# Patient Record
Sex: MALE
[De-identification: names, ages, dates, MRNs, and addresses within clinical notes are randomized per-mention and may not be internally consistent; named-entity substitution may affect disease eponyms.]

---

## 2019-02-21 NOTE — EMERGENCY DEPARTMENT REPORT
ED General Adult HPI





- General


Chief complaint: Altered Mental Status


Stated complaint: AMS


Time Seen by Provider: 02/21/19 11:54


Source: family, EMS


Mode of arrival: Stretcher


Limitations: Altered Mental Status





- History of Present Illness


Initial comments: 





Patient to emergency department via EMS for altered mental status.  The 

patient's sister states the last 2 days the patient has not been his normal self

and has had a fever.  She states the patient is a daily drinker and last had 

something to drink a week ago.  The patient is not able to add to the history.


-: Gradual


Severity scale (0 -10): 0


Consistency: constant


Improves with: none


Worsens with: none


Associated Symptoms: denies other symptoms


Treatments Prior to Arrival: none





- Related Data


                                    Allergies











Allergy/AdvReac Type Severity Reaction Status Date / Time


 


No Known Allergies Allergy   Unverified 02/21/19 11:27














ED Review of Systems


ROS: 


Stated complaint: AMS


Other details as noted in HPI





Comment: Unobtainable due to pts medical conditions





ED Past Medical Hx





- Past Medical History


Hx COPD: Yes





- Social History


Smoking Status: Unknown if ever smoked





ED Physical Exam





- General


Limitations: Altered Mental Status


General appearance: obtunded





- Head


Head exam: Present: atraumatic, normocephalic





- Eye


Eye exam: Present: normal appearance, PERRL, EOMI





- ENT


ENT exam: Present: mucous membranes dry





- Neck


Neck exam: Present: normal inspection





- Respiratory


Respiratory exam: Present: normal lung sounds bilaterally.  Absent: respiratory 

distress, wheezes, rales





- Cardiovascular


Cardiovascular Exam: Present: normal rhythm, tachycardia.  Absent: systolic 

murmur, diastolic murmur, rubs, gallop





- GI/Abdominal


GI/Abdominal exam: Present: soft, normal bowel sounds.  Absent: distended, 

tenderness





- Rectal


Rectal exam: Present: heme (+) stool





- Extremities Exam


Extremities exam: Present: normal inspection





- Back Exam


Back exam: Present: normal inspection





- Neurological Exam


Neurological exam: Present: other (not able to completely assess due to the 

patient's condition)





- Psychiatric


Psychiatric exam: Present: other (not able to assess due to the patient's 

condition)





- Skin


Skin exam: Present: warm, dry, intact, normal color.  Absent: rash





ED Course


                                   Vital Signs











  02/21/19





  12:00


 


Temperature 96.8 F L


 


Pulse Rate 110 H


 


Respiratory 18





Rate 


 


Blood Pressure 129/87





[Right] 


 


O2 Sat by Pulse 97





Oximetry 














ED Medical Decision Making





- Lab Data


Result diagrams: 


                                 02/21/19 12:02





                                 02/21/19 12:02








                                   Lab Results











  02/21/19 02/21/19 02/21/19 Range/Units





  12:02 12:02 12:02 


 


WBC  12.2 H    (4.5-11.0)  K/mm3


 


RBC  4.18    (3.65-5.03)  M/mm3


 


Hgb  13.1    (11.8-15.2)  gm/dl


 


Hct  38.9    (35.5-45.6)  %


 


MCV  93    (84-94)  fl


 


MCH  31    (28-32)  pg


 


MCHC  34    (32-34)  %


 


RDW  13.5    (13.2-15.2)  %


 


Plt Count  370    (140-440)  K/mm3


 


Lymph % (Auto)  7.5 L    (13.4-35.0)  %


 


Mono % (Auto)  10.3 H    (0.0-7.3)  %


 


Eos % (Auto)  0.0    (0.0-4.3)  %


 


Baso % (Auto)  0.2    (0.0-1.8)  %


 


Lymph #  0.9 L    (1.2-5.4)  K/mm3


 


Mono #  1.2 H    (0.0-0.8)  K/mm3


 


Eos #  0.0    (0.0-0.4)  K/mm3


 


Baso #  0.0    (0.0-0.1)  K/mm3


 


Seg Neutrophils %  82.0 H    (40.0-70.0)  %


 


Seg Neutrophils #  10.0 H    (1.8-7.7)  K/mm3


 


PT   13.7   (12.2-14.9)  Sec.


 


INR   0.99   (0.87-1.13)  


 


APTT     (24.2-36.6)  Sec.


 


VBG pH     (7.320-7.420)  


 


Sodium    135 L  (137-145)  mmol/L


 


Potassium    4.3  (3.6-5.0)  mmol/L


 


Chloride    93.2 L  ()  mmol/L


 


Carbon Dioxide    21 L  (22-30)  mmol/L


 


Anion Gap    25  mmol/L


 


BUN    55 H  (9-20)  mg/dL


 


Creatinine    1.6 H  (0.8-1.5)  mg/dL


 


Estimated GFR    45  ml/min


 


BUN/Creatinine Ratio    34  %


 


Glucose    157 H  ()  mg/dL


 


Lactic Acid     (0.7-2.0)  mmol/L


 


Calcium    9.5  (8.4-10.2)  mg/dL


 


Magnesium     (1.7-2.3)  mg/dL


 


Total Bilirubin    0.60  (0.1-1.2)  mg/dL


 


AST    7  (5-40)  units/L


 


ALT    < 5 L  (7-56)  units/L


 


Alkaline Phosphatase    62  ()  units/L


 


Ammonia     (25-60)  umol/L


 


Total Creatine Kinase    18 L  ()  units/L


 


Troponin T    < 0.010  (0.00-0.029)  ng/mL


 


Total Protein    8.4 H  (6.3-8.2)  g/dL


 


Albumin    4.1  (3.9-5)  g/dL


 


Albumin/Globulin Ratio    1.0  %


 


TSH     (0.270-4.200)  mlU/mL


 


Urine Color     (Yellow)  


 


Urine Turbidity     (Clear)  


 


Urine pH     (5.0-7.0)  


 


Ur Specific Gravity     (1.003-1.030)  


 


Urine Protein     (Negative)  mg/dL


 


Urine Glucose (UA)     (Negative)  mg/dL


 


Urine Ketones     (Negative)  mg/dL


 


Urine Blood     (Negative)  


 


Urine Nitrite     (Negative)  


 


Urine Bilirubin     (Negative)  


 


Urine Urobilinogen     (<2.0)  mg/dL


 


Ur Leukocyte Esterase     (Negative)  


 


Urine WBC (Auto)     (0.0-6.0)  /HPF


 


Urine RBC (Auto)     (0.0-6.0)  /HPF


 


U Epithel Cells (Auto)     (0-13.0)  /HPF


 


Urine Bacteria (Auto)     (Negative)  /HPF


 


Urine WBC Clumps     /HPF


 


Urine Mucus     /HPF


 


Salicylates     (2.8-20.0)  mg/dL


 


Urine Opiates Screen     


 


Urine Methadone Screen     


 


Acetaminophen     (10.0-30.0)  ug/mL


 


Ur Barbiturates Screen     


 


Ur Phencyclidine Scrn     


 


Ur Amphetamines Screen     


 


U Benzodiazepines Scrn     


 


Urine Cocaine Screen     


 


U Marijuana (THC) Screen     


 


Drugs of Abuse Note     


 


Plasma/Serum Alcohol     (0-0.07)  %














  02/21/19 02/21/19 02/21/19 Range/Units





  12:02 12:02 12:02 


 


WBC     (4.5-11.0)  K/mm3


 


RBC     (3.65-5.03)  M/mm3


 


Hgb     (11.8-15.2)  gm/dl


 


Hct     (35.5-45.6)  %


 


MCV     (84-94)  fl


 


MCH     (28-32)  pg


 


MCHC     (32-34)  %


 


RDW     (13.2-15.2)  %


 


Plt Count     (140-440)  K/mm3


 


Lymph % (Auto)     (13.4-35.0)  %


 


Mono % (Auto)     (0.0-7.3)  %


 


Eos % (Auto)     (0.0-4.3)  %


 


Baso % (Auto)     (0.0-1.8)  %


 


Lymph #     (1.2-5.4)  K/mm3


 


Mono #     (0.0-0.8)  K/mm3


 


Eos #     (0.0-0.4)  K/mm3


 


Baso #     (0.0-0.1)  K/mm3


 


Seg Neutrophils %     (40.0-70.0)  %


 


Seg Neutrophils #     (1.8-7.7)  K/mm3


 


PT     (12.2-14.9)  Sec.


 


INR     (0.87-1.13)  


 


APTT     (24.2-36.6)  Sec.


 


VBG pH     (7.320-7.420)  


 


Sodium     (137-145)  mmol/L


 


Potassium     (3.6-5.0)  mmol/L


 


Chloride     ()  mmol/L


 


Carbon Dioxide     (22-30)  mmol/L


 


Anion Gap     mmol/L


 


BUN     (9-20)  mg/dL


 


Creatinine     (0.8-1.5)  mg/dL


 


Estimated GFR     ml/min


 


BUN/Creatinine Ratio     %


 


Glucose     ()  mg/dL


 


Lactic Acid  3.60 H*    (0.7-2.0)  mmol/L


 


Calcium     (8.4-10.2)  mg/dL


 


Magnesium     (1.7-2.3)  mg/dL


 


Total Bilirubin     (0.1-1.2)  mg/dL


 


AST     (5-40)  units/L


 


ALT     (7-56)  units/L


 


Alkaline Phosphatase     ()  units/L


 


Ammonia   78.0 H   (25-60)  umol/L


 


Total Creatine Kinase     ()  units/L


 


Troponin T     (0.00-0.029)  ng/mL


 


Total Protein     (6.3-8.2)  g/dL


 


Albumin     (3.9-5)  g/dL


 


Albumin/Globulin Ratio     %


 


TSH    0.969  (0.270-4.200)  mlU/mL


 


Urine Color     (Yellow)  


 


Urine Turbidity     (Clear)  


 


Urine pH     (5.0-7.0)  


 


Ur Specific Gravity     (1.003-1.030)  


 


Urine Protein     (Negative)  mg/dL


 


Urine Glucose (UA)     (Negative)  mg/dL


 


Urine Ketones     (Negative)  mg/dL


 


Urine Blood     (Negative)  


 


Urine Nitrite     (Negative)  


 


Urine Bilirubin     (Negative)  


 


Urine Urobilinogen     (<2.0)  mg/dL


 


Ur Leukocyte Esterase     (Negative)  


 


Urine WBC (Auto)     (0.0-6.0)  /HPF


 


Urine RBC (Auto)     (0.0-6.0)  /HPF


 


U Epithel Cells (Auto)     (0-13.0)  /HPF


 


Urine Bacteria (Auto)     (Negative)  /HPF


 


Urine WBC Clumps     /HPF


 


Urine Mucus     /HPF


 


Salicylates     (2.8-20.0)  mg/dL


 


Urine Opiates Screen     


 


Urine Methadone Screen     


 


Acetaminophen     (10.0-30.0)  ug/mL


 


Ur Barbiturates Screen     


 


Ur Phencyclidine Scrn     


 


Ur Amphetamines Screen     


 


U Benzodiazepines Scrn     


 


Urine Cocaine Screen     


 


U Marijuana (THC) Screen     


 


Drugs of Abuse Note     


 


Plasma/Serum Alcohol     (0-0.07)  %














  02/21/19 02/21/19 02/21/19 Range/Units





  12:02 12:02 12:02 


 


WBC     (4.5-11.0)  K/mm3


 


RBC     (3.65-5.03)  M/mm3


 


Hgb     (11.8-15.2)  gm/dl


 


Hct     (35.5-45.6)  %


 


MCV     (84-94)  fl


 


MCH     (28-32)  pg


 


MCHC     (32-34)  %


 


RDW     (13.2-15.2)  %


 


Plt Count     (140-440)  K/mm3


 


Lymph % (Auto)     (13.4-35.0)  %


 


Mono % (Auto)     (0.0-7.3)  %


 


Eos % (Auto)     (0.0-4.3)  %


 


Baso % (Auto)     (0.0-1.8)  %


 


Lymph #     (1.2-5.4)  K/mm3


 


Mono #     (0.0-0.8)  K/mm3


 


Eos #     (0.0-0.4)  K/mm3


 


Baso #     (0.0-0.1)  K/mm3


 


Seg Neutrophils %     (40.0-70.0)  %


 


Seg Neutrophils #     (1.8-7.7)  K/mm3


 


PT     (12.2-14.9)  Sec.


 


INR     (0.87-1.13)  


 


APTT     (24.2-36.6)  Sec.


 


VBG pH     (7.320-7.420)  


 


Sodium     (137-145)  mmol/L


 


Potassium     (3.6-5.0)  mmol/L


 


Chloride     ()  mmol/L


 


Carbon Dioxide     (22-30)  mmol/L


 


Anion Gap     mmol/L


 


BUN     (9-20)  mg/dL


 


Creatinine     (0.8-1.5)  mg/dL


 


Estimated GFR     ml/min


 


BUN/Creatinine Ratio     %


 


Glucose     ()  mg/dL


 


Lactic Acid     (0.7-2.0)  mmol/L


 


Calcium     (8.4-10.2)  mg/dL


 


Magnesium     (1.7-2.3)  mg/dL


 


Total Bilirubin     (0.1-1.2)  mg/dL


 


AST     (5-40)  units/L


 


ALT     (7-56)  units/L


 


Alkaline Phosphatase     ()  units/L


 


Ammonia     (25-60)  umol/L


 


Total Creatine Kinase     ()  units/L


 


Troponin T     (0.00-0.029)  ng/mL


 


Total Protein     (6.3-8.2)  g/dL


 


Albumin     (3.9-5)  g/dL


 


Albumin/Globulin Ratio     %


 


TSH     (0.270-4.200)  mlU/mL


 


Urine Color     (Yellow)  


 


Urine Turbidity     (Clear)  


 


Urine pH     (5.0-7.0)  


 


Ur Specific Gravity     (1.003-1.030)  


 


Urine Protein     (Negative)  mg/dL


 


Urine Glucose (UA)     (Negative)  mg/dL


 


Urine Ketones     (Negative)  mg/dL


 


Urine Blood     (Negative)  


 


Urine Nitrite     (Negative)  


 


Urine Bilirubin     (Negative)  


 


Urine Urobilinogen     (<2.0)  mg/dL


 


Ur Leukocyte Esterase     (Negative)  


 


Urine WBC (Auto)     (0.0-6.0)  /HPF


 


Urine RBC (Auto)     (0.0-6.0)  /HPF


 


U Epithel Cells (Auto)     (0-13.0)  /HPF


 


Urine Bacteria (Auto)     (Negative)  /HPF


 


Urine WBC Clumps     /HPF


 


Urine Mucus     /HPF


 


Salicylates  < 0.3 L    (2.8-20.0)  mg/dL


 


Urine Opiates Screen     


 


Urine Methadone Screen     


 


Acetaminophen   < 5.0 L   (10.0-30.0)  ug/mL


 


Ur Barbiturates Screen     


 


Ur Phencyclidine Scrn     


 


Ur Amphetamines Screen     


 


U Benzodiazepines Scrn     


 


Urine Cocaine Screen     


 


U Marijuana (THC) Screen     


 


Drugs of Abuse Note     


 


Plasma/Serum Alcohol    < 0.01  (0-0.07)  %














  02/21/19 02/21/19 02/21/19 Range/Units





  12:02 12:02 12:02 


 


WBC     (4.5-11.0)  K/mm3


 


RBC     (3.65-5.03)  M/mm3


 


Hgb     (11.8-15.2)  gm/dl


 


Hct     (35.5-45.6)  %


 


MCV     (84-94)  fl


 


MCH     (28-32)  pg


 


MCHC     (32-34)  %


 


RDW     (13.2-15.2)  %


 


Plt Count     (140-440)  K/mm3


 


Lymph % (Auto)     (13.4-35.0)  %


 


Mono % (Auto)     (0.0-7.3)  %


 


Eos % (Auto)     (0.0-4.3)  %


 


Baso % (Auto)     (0.0-1.8)  %


 


Lymph #     (1.2-5.4)  K/mm3


 


Mono #     (0.0-0.8)  K/mm3


 


Eos #     (0.0-0.4)  K/mm3


 


Baso #     (0.0-0.1)  K/mm3


 


Seg Neutrophils %     (40.0-70.0)  %


 


Seg Neutrophils #     (1.8-7.7)  K/mm3


 


PT     (12.2-14.9)  Sec.


 


INR     (0.87-1.13)  


 


APTT    27.9  (24.2-36.6)  Sec.


 


VBG pH   7.392   (7.320-7.420)  


 


Sodium     (137-145)  mmol/L


 


Potassium     (3.6-5.0)  mmol/L


 


Chloride     ()  mmol/L


 


Carbon Dioxide     (22-30)  mmol/L


 


Anion Gap     mmol/L


 


BUN     (9-20)  mg/dL


 


Creatinine     (0.8-1.5)  mg/dL


 


Estimated GFR     ml/min


 


BUN/Creatinine Ratio     %


 


Glucose     ()  mg/dL


 


Lactic Acid     (0.7-2.0)  mmol/L


 


Calcium     (8.4-10.2)  mg/dL


 


Magnesium  2.70 H    (1.7-2.3)  mg/dL


 


Total Bilirubin     (0.1-1.2)  mg/dL


 


AST     (5-40)  units/L


 


ALT     (7-56)  units/L


 


Alkaline Phosphatase     ()  units/L


 


Ammonia     (25-60)  umol/L


 


Total Creatine Kinase     ()  units/L


 


Troponin T     (0.00-0.029)  ng/mL


 


Total Protein     (6.3-8.2)  g/dL


 


Albumin     (3.9-5)  g/dL


 


Albumin/Globulin Ratio     %


 


TSH     (0.270-4.200)  mlU/mL


 


Urine Color     (Yellow)  


 


Urine Turbidity     (Clear)  


 


Urine pH     (5.0-7.0)  


 


Ur Specific Gravity     (1.003-1.030)  


 


Urine Protein     (Negative)  mg/dL


 


Urine Glucose (UA)     (Negative)  mg/dL


 


Urine Ketones     (Negative)  mg/dL


 


Urine Blood     (Negative)  


 


Urine Nitrite     (Negative)  


 


Urine Bilirubin     (Negative)  


 


Urine Urobilinogen     (<2.0)  mg/dL


 


Ur Leukocyte Esterase     (Negative)  


 


Urine WBC (Auto)     (0.0-6.0)  /HPF


 


Urine RBC (Auto)     (0.0-6.0)  /HPF


 


U Epithel Cells (Auto)     (0-13.0)  /HPF


 


Urine Bacteria (Auto)     (Negative)  /HPF


 


Urine WBC Clumps     /HPF


 


Urine Mucus     /HPF


 


Salicylates     (2.8-20.0)  mg/dL


 


Urine Opiates Screen     


 


Urine Methadone Screen     


 


Acetaminophen     (10.0-30.0)  ug/mL


 


Ur Barbiturates Screen     


 


Ur Phencyclidine Scrn     


 


Ur Amphetamines Screen     


 


U Benzodiazepines Scrn     


 


Urine Cocaine Screen     


 


U Marijuana (THC) Screen     


 


Drugs of Abuse Note     


 


Plasma/Serum Alcohol     (0-0.07)  %














  02/21/19 02/21/19 Range/Units





  12:46 12:46 


 


WBC    (4.5-11.0)  K/mm3


 


RBC    (3.65-5.03)  M/mm3


 


Hgb    (11.8-15.2)  gm/dl


 


Hct    (35.5-45.6)  %


 


MCV    (84-94)  fl


 


MCH    (28-32)  pg


 


MCHC    (32-34)  %


 


RDW    (13.2-15.2)  %


 


Plt Count    (140-440)  K/mm3


 


Lymph % (Auto)    (13.4-35.0)  %


 


Mono % (Auto)    (0.0-7.3)  %


 


Eos % (Auto)    (0.0-4.3)  %


 


Baso % (Auto)    (0.0-1.8)  %


 


Lymph #    (1.2-5.4)  K/mm3


 


Mono #    (0.0-0.8)  K/mm3


 


Eos #    (0.0-0.4)  K/mm3


 


Baso #    (0.0-0.1)  K/mm3


 


Seg Neutrophils %    (40.0-70.0)  %


 


Seg Neutrophils #    (1.8-7.7)  K/mm3


 


PT    (12.2-14.9)  Sec.


 


INR    (0.87-1.13)  


 


APTT    (24.2-36.6)  Sec.


 


VBG pH    (7.320-7.420)  


 


Sodium    (137-145)  mmol/L


 


Potassium    (3.6-5.0)  mmol/L


 


Chloride    ()  mmol/L


 


Carbon Dioxide    (22-30)  mmol/L


 


Anion Gap    mmol/L


 


BUN    (9-20)  mg/dL


 


Creatinine    (0.8-1.5)  mg/dL


 


Estimated GFR    ml/min


 


BUN/Creatinine Ratio    %


 


Glucose    ()  mg/dL


 


Lactic Acid    (0.7-2.0)  mmol/L


 


Calcium    (8.4-10.2)  mg/dL


 


Magnesium    (1.7-2.3)  mg/dL


 


Total Bilirubin    (0.1-1.2)  mg/dL


 


AST    (5-40)  units/L


 


ALT    (7-56)  units/L


 


Alkaline Phosphatase    ()  units/L


 


Ammonia    (25-60)  umol/L


 


Total Creatine Kinase    ()  units/L


 


Troponin T    (0.00-0.029)  ng/mL


 


Total Protein    (6.3-8.2)  g/dL


 


Albumin    (3.9-5)  g/dL


 


Albumin/Globulin Ratio    %


 


TSH    (0.270-4.200)  mlU/mL


 


Urine Color  Korin   (Yellow)  


 


Urine Turbidity  Turbid   (Clear)  


 


Urine pH  8.0 H   (5.0-7.0)  


 


Ur Specific Gravity  1.012   (1.003-1.030)  


 


Urine Protein  >2000 mg dl   (Negative)  mg/dL


 


Urine Glucose (UA)  Neg   (Negative)  mg/dL


 


Urine Ketones  Neg   (Negative)  mg/dL


 


Urine Blood  Neg   (Negative)  


 


Urine Nitrite  Neg   (Negative)  


 


Urine Bilirubin  Neg   (Negative)  


 


Urine Urobilinogen  < 2.0   (<2.0)  mg/dL


 


Ur Leukocyte Esterase  Lg   (Negative)  


 


Urine WBC (Auto)  150.0 H   (0.0-6.0)  /HPF


 


Urine RBC (Auto)  44.0   (0.0-6.0)  /HPF


 


U Epithel Cells (Auto)  < 1.0   (0-13.0)  /HPF


 


Urine Bacteria (Auto)  2+   (Negative)  /HPF


 


Urine WBC Clumps  2+   /HPF


 


Urine Mucus  2+   /HPF


 


Salicylates    (2.8-20.0)  mg/dL


 


Urine Opiates Screen   Presumptive negative  


 


Urine Methadone Screen   Presumptive negative  


 


Acetaminophen    (10.0-30.0)  ug/mL


 


Ur Barbiturates Screen   Presumptive negative  


 


Ur Phencyclidine Scrn   Presumptive negative  


 


Ur Amphetamines Screen   Presumptive negative  


 


U Benzodiazepines Scrn   Presumptive negative  


 


Urine Cocaine Screen   Presumptive negative  


 


U Marijuana (THC) Screen   Presumptive negative  


 


Drugs of Abuse Note   Disclamer  


 


Plasma/Serum Alcohol    (0-0.07)  %














- Radiology Data


Radiology results: report reviewed





- Medical Decision Making





No aspirin given due to the patient having rectal bleeding


Discussed results with patient 


Critical Care Time: Yes


Critical care time in (mins) excluding proc time.: 45


Critical care attestation.: 


If time is entered above; I have spent that time in minutes in the direct care 

of this critically ill patient, excluding procedure time.








ED Disposition


Clinical Impression: 


 Sepsis, Altered mental status, Rectal bleeding





Disposition: [id number] OP ADMIT IP TO THIS HOSP


Is pt being admited?: Yes


Does the pt Need Aspirin: No


Condition: Fair


Referrals: 


PRIMARY CARE,MD [Primary Care Provider] - 3-5 Days





- Assessment


Assessment Interval: Baseline





- Level of Consciousness


1a. Level of Consciousness: resp stimuli/obtunded





- LOC Questions


1b. LOC Questions: answers no questions correctly





- LOC Command


1c. LOC Commands: performs no tasks correctly





- Best Gaze


2. Best Gaze: normal





- Visual


3. Visual: no visual loss





- Facial Palsy


4. Facial Palsy: normal symmetrical movement





- Motor Arm


5b. Motor Arm Right: no drift


5a. Motor Arm Left: no drift





- Motor Leg


6b. Motor Leg Right: no drift


6a. Motor Leg Left: no drift





- Limb Ataxia


7. Limb Ataxia: absent





- Sensory


8. Sensory: normal





- Best Language


9. Best Language: no aphasia





- Dysarthria


10. Dysarthria: normal





- Extinction and Inattention


11. Extinction/Inattention: no abnormality





- Scoring


Total Score: 6


Stroke Severity: Moderate Stroke

## 2019-02-21 NOTE — XRAY REPORT
AP CHEST:



HISTORY: Altered mental status



No comparison. The lungs are mildly hyperinflated. No evidence for 

pneumonia, pleural effusion or pneumothorax. Heart size and pulmonary 

vascularity are within normal limits. The bony thorax is grossly intact.



IMPRESSION:

Mild hyperinflation. No acute process is appreciated.

## 2019-02-21 NOTE — CAT SCAN REPORT
FINAL REPORT



EXAM:  CT HEAD/BRAIN WO CON



HISTORY:  Altered Mental Status 



TECHNIQUE:  CT head without contrast 



PRIORS:  None.



FINDINGS:  

No acute intra-axial or extra-axial hemorrhage is identified.  There is no evidence of midline shift 
or mass effect.  The ventricles and sulci are within normal limits. Gray-white matter differentiation
 is intact. No acute parenchymal abnormalities seen. 



Bony calvarium is grossly intact.  Visualized portions of the mastoids and paranasal sinuses are unre
markable. 



IMPRESSION:  

Negative CT head

## 2019-02-21 NOTE — HISTORY AND PHYSICAL REPORT
History of Present Illness


Chief complaint: 


Confused, 


History of present illness: 


53 YO Male with ETOH Dependence, COPD, Malnutrition presents to ED for 

evaluation. Pt is Lethargic and unable to provide history. Pt history taken from

family who is at bedside during exam and interview. As per family, the patient 

has become increasingly confused and weak over the past 4 days with worsening 

symptoms over the past 2 days. Pt is currently unable to independently conduct 

activities of daily living. EMS notified, and upon arrival the patient was found

to be in distress and transported to [facility] for further care and evaluation. Pt 

seen and evaluated in ED and found to have Encephalopathy, Sepsis secondary to 

UTI, Acute Renal Failure, and Rectal Bleeding. Pt admitted to IMCU and initiated

on sepsis protocol. GI consulted. No other history obtainable. 








Past History


Past Medical History: COPD, other (Malnutrition)


Past Surgical History: No surgical history, Other (reviewed)


Social history: alcohol abuse


Family history: no significant family history (reviewed)





Medications and Allergies


                                    Allergies











Allergy/AdvReac Type Severity Reaction Status Date / Time


 


No Known Allergies Allergy   Unverified 02/21/19 11:27











Active Meds: 


Active Medications





Cefepime HCl (Maxipime/Ns 2 Gm/100 Ml)  2 gm in 100 mls @ 200 mls/hr IV Q12HR 

[facility]; Protocol


   Last Admin: 02/21/19 15:46 Dose:  200 mls/hr


   Documented by: 











Review of Systems


ROS unobtainable: due to mental status





Exam





- Constitutional


Vitals: 


                                        











Temp Pulse Resp BP Pulse Ox


 


 96.8 F L  110 H  18   129/87   97 


 


 02/21/19 12:00  02/21/19 12:00  02/21/19 12:00  02/21/19 12:00  02/21/19 12:00











General appearance: Present: mild distress, cachectic, disheveled, malodorous





- EENT


Eyes: Present: miosis





- Neck


Neck: Present: supple, normal ROM





- Respiratory


Respiratory effort: normal


Respiratory: bilateral: CTA





- Cardiovascular


Rhythm: other (taachycardia)


Heart Sounds: Present: S1 & S2.  Absent: rub, click





- Extremities


Extremities: pulses symmetrical, No edema


Peripheral Pulses: abnormal (capillary refill greater than 3.5 seconds)





- Abdominal


General gastrointestinal: Present: soft, non-tender, non-distended, normal bowel

sounds


Male genitourinary: Present: normal





- Integumentary


Integumentary: Present: clear, dry, clammy, decreased turgor





- Musculoskeletal


Musculoskeletal: generalized weakness





- Psychiatric


Psychiatric: no appropriate mood/affect, no intact judgment & insight, no memory

intact





- Neurologic


Neurologic: no CNII-XII intact, moves all extremities, no gait normal





Results





- Labs


CBC & Chem 7: 


                                 02/21/19 12:02





                                 02/21/19 12:02


Labs: 


                              Abnormal lab results











  02/21/19 02/21/19 02/21/19 Range/Units





  12:02 12:02 12:02 


 


WBC  12.2 H    (4.5-11.0)  K/mm3


 


Lymph % (Auto)  7.5 L    (13.4-35.0)  %


 


Mono % (Auto)  10.3 H    (0.0-7.3)  %


 


Lymph #  0.9 L    (1.2-5.4)  K/mm3


 


Mono #  1.2 H    (0.0-0.8)  K/mm3


 


Seg Neutrophils %  82.0 H    (40.0-70.0)  %


 


Seg Neutrophils #  10.0 H    (1.8-7.7)  K/mm3


 


Sodium   135 L   (137-145)  mmol/L


 


Chloride   93.2 L   ()  mmol/L


 


Carbon Dioxide   21 L   (22-30)  mmol/L


 


BUN   55 H   (9-20)  mg/dL


 


Creatinine   1.6 H   (0.8-1.5)  mg/dL


 


Glucose   157 H   ()  mg/dL


 


Lactic Acid    3.60 H*  (0.7-2.0)  mmol/L


 


Magnesium     (1.7-2.3)  mg/dL


 


ALT   < 5 L   (7-56)  units/L


 


Ammonia     (25-60)  umol/L


 


Total Creatine Kinase   18 L   ()  units/L


 


Total Protein   8.4 H   (6.3-8.2)  g/dL


 


Urine pH     (5.0-7.0)  


 


Urine WBC (Auto)     (0.0-6.0)  /HPF


 


Salicylates     (2.8-20.0)  mg/dL


 


Acetaminophen     (10.0-30.0)  ug/mL














  02/21/19 02/21/19 02/21/19 Range/Units





  12:02 12:02 12:02 


 


WBC     (4.5-11.0)  K/mm3


 


Lymph % (Auto)     (13.4-35.0)  %


 


Mono % (Auto)     (0.0-7.3)  %


 


Lymph #     (1.2-5.4)  K/mm3


 


Mono #     (0.0-0.8)  K/mm3


 


Seg Neutrophils %     (40.0-70.0)  %


 


Seg Neutrophils #     (1.8-7.7)  K/mm3


 


Sodium     (137-145)  mmol/L


 


Chloride     ()  mmol/L


 


Carbon Dioxide     (22-30)  mmol/L


 


BUN     (9-20)  mg/dL


 


Creatinine     (0.8-1.5)  mg/dL


 


Glucose     ()  mg/dL


 


Lactic Acid     (0.7-2.0)  mmol/L


 


Magnesium     (1.7-2.3)  mg/dL


 


ALT     (7-56)  units/L


 


Ammonia  78.0 H    (25-60)  umol/L


 


Total Creatine Kinase     ()  units/L


 


Total Protein     (6.3-8.2)  g/dL


 


Urine pH     (5.0-7.0)  


 


Urine WBC (Auto)     (0.0-6.0)  /HPF


 


Salicylates   < 0.3 L   (2.8-20.0)  mg/dL


 


Acetaminophen    < 5.0 L  (10.0-30.0)  ug/mL














  02/21/19 02/21/19 02/21/19 Range/Units





  12:02 12:46 16:14 


 


WBC     (4.5-11.0)  K/mm3


 


Lymph % (Auto)     (13.4-35.0)  %


 


Mono % (Auto)     (0.0-7.3)  %


 


Lymph #     (1.2-5.4)  K/mm3


 


Mono #     (0.0-0.8)  K/mm3


 


Seg Neutrophils %     (40.0-70.0)  %


 


Seg Neutrophils #     (1.8-7.7)  K/mm3


 


Sodium     (137-145)  mmol/L


 


Chloride     ()  mmol/L


 


Carbon Dioxide     (22-30)  mmol/L


 


BUN     (9-20)  mg/dL


 


Creatinine     (0.8-1.5)  mg/dL


 


Glucose     ()  mg/dL


 


Lactic Acid    2.70 H*  (0.7-2.0)  mmol/L


 


Magnesium  2.70 H    (1.7-2.3)  mg/dL


 


ALT     (7-56)  units/L


 


Ammonia     (25-60)  umol/L


 


Total Creatine Kinase     ()  units/L


 


Total Protein     (6.3-8.2)  g/dL


 


Urine pH   8.0 H   (5.0-7.0)  


 


Urine WBC (Auto)   150.0 H   (0.0-6.0)  /HPF


 


Salicylates     (2.8-20.0)  mg/dL


 


Acetaminophen     (10.0-30.0)  ug/mL














Assessment and Plan





- Patient Problems


(1) Sepsis


Current Visit: Yes   Status: Acute   


Qualifiers: 


   Sepsis type: sepsis due to unspecified organism   Qualified Code(s): A41.9 - 

Sepsis, unspecified organism   


Plan to address problem: 


Sepsis protocol: IV antibiotic therapy, monitor uop q shift, serial lactic acid,

IVF resuscitation therapy, cbc, cmp, chest x ray, urinalysis.








(2) GI bleed


Current Visit: Yes   Status: Acute   


Qualifiers: 


   Gastritis type: alcoholic 


Plan to address problem: 


GI consulted in ED, IV ppi therapy, cbc,  supportive care. 








(3) Encephalopathy


Current Visit: Yes   Status: Acute   


Plan to address problem: 


CT Head, neuro check, seizure precautions, aspiration precautions, treat sepsis.








(4) ARF (acute renal failure) with tubular necrosis


Current Visit: Yes   Status: Acute   


Plan to address problem: 


IVF resuscitation, monitor uop q shift, urine electrolytes, repeat bmp, monitor 

serum creatnine.








(5) UTI (urinary tract infection)


Current Visit: Yes   Status: Acute   


Qualifiers: 


   Encounter type: initial encounter 


Plan to address problem: 


IV antibiotic therapy, supportive care, cbc, urinalysis








(6) EtOH dependence


Current Visit: Yes   Status: Acute   


Qualifiers: 


   Substance use status: uncomplicated   Qualified Code(s): F10.20 - Alcohol dep

endence, uncomplicated   


Plan to address problem: 


Thiamine, Folic acid, multivitamin, ciwa protocol.








(7) DVT prophylaxis


Current Visit: Yes   Status: Acute   


Plan to address problem: 


SCD to BLE while in bed

## 2019-02-22 NOTE — GASTROENTEROLOGY CONSULTATION
Addendum entered and electronically signed by MARIE OSEGUERA MD  02/22/19 17:15: 





Patient seen and examined on 02/22/2019. Agree with A/P as stated. 





53 yo male with pmh of ETOH abuse, COPD, and malnutrition admitted for AMS, 

sepsis, and UTI. GI consulted for reports of rectal bleeding per family report. 

H/H WNL on admission, now 10.7/33.4 (likely due to hemodilution). No overt signs

of GI bleeding. Per nursing, patient had brown stool. 


- monitor H/H. 


- no plan for endoscopy at this time. 


- will follow. 





Original Note:








History of Present Illness





- Reason for Consult


Consult date: 02/22/19


GI bleeding


Requesting physician: SALENA CALIX





- History of Present Illness


Patient is a 53 y/o male with PMH of ETOH dependence, COPD, and malnutrition who

presented to ED with AMS and was found to have enceophalopathy, sepsis 2/2 UTI, 

and acute renal failure upon admission. There were also reports of rectal 

bleeding per family to which GI has been consulted. This morning, patient was 

resting in bed w/o acute distress but noted to be lethargic and unable to 

provide history. No family at bedside. History obtained via chart review. No 

active signs of bleeding overnight or this am to include hematemesis, melena, or

hematochezia. BM x 1 this am per nursing with brown stool. Previous GI history 

unknown.











Past History


Past Medical History: COPD, other (Malnutrition)


Past Surgical History: No surgical history, Other (reviewed)


Social history: alcohol abuse


Family history: no significant family history (reviewed)





Medications and Allergies


                                    Allergies











Allergy/AdvReac Type Severity Reaction Status Date / Time


 


No Known Allergies Allergy   Unverified 02/21/19 11:27











                                Home Medications











 Medication  Instructions  Recorded  Confirmed  Last Taken  Type


 


Unobtainable  02/21/19 02/21/19 Unknown History











Active Meds: 


Active Medications





Acetaminophen (Tylenol)  650 mg PO Q4H PRN


   PRN Reason: Pain MILD(1-3)/Fever >100.5/HA


Albuterol (Proventil)  2.5 mg IH Q4HRT PRN


   PRN Reason: Shortness Of Breath


Cefepime HCl (Maxipime/Ns 2 Gm/100 Ml)  2 gm in 100 mls @ 200 mls/hr IV Q8HR 

MIGUEL; Protocol


   Last Admin: 02/22/19 06:01 Dose:  200 mls/hr


   Documented by: 


Dextrose/Sodium Chloride (D5/0.45ns)  1,000 mls @ 50 mls/hr IV AS DIRECT [facility]


   Last Admin: 02/22/19 02:07 Dose:  50 mls/hr


   Documented by: 


Ondansetron HCl (Zofran)  4 mg IV Q8H PRN


   PRN Reason: Nausea And Vomiting


Pantoprazole Sodium (Protonix)  40 mg IV BID [facility]


   Last Admin: 02/22/19 10:18 Dose:  40 mg


   Documented by: 


Sodium Chloride (Sodium Chloride Flush Syringe 10 Ml)  10 ml IV BID [facility]


   Last Admin: 02/22/19 10:18 Dose:  10 ml


   Documented by: 


Sodium Chloride (Sodium Chloride Flush Syringe 10 Ml)  10 ml IV PRN PRN


   PRN Reason: LINE FLUSH





medications reviewed/updated as required





Review of Systems





- Review of Systems


ROS unobtainable: due to mental status





Exam





- Constitutional


Vital Signs: 


                                        











Temp Pulse Resp BP Pulse Ox


 


 96.8 F L  101 H  21   131/90   99 


 


 02/21/19 12:00  02/22/19 04:00  02/22/19 04:00  02/21/19 20:55  02/22/19 07:36











General appearance: no acute distress, other (lethargic)





- Respiratory


Respiratory: bilateral: CTA (anterior)





- Cardiovascular


Rhythm: regular


Heart Sounds: Present: S1 & S2





- Gastrointestinal


General gastrointestinal: Present: soft, non-distended, normal bowel sounds





- Labs


CBC & Chem 7: 


                                 02/22/19 05:00





                                 02/22/19 05:00


Lab Results: 


                         Laboratory Results - last 24 hr











  02/21/19 02/21/19 02/21/19





  12:02 12:02 12:02


 


WBC  12.2 H  


 


RBC  4.18  


 


Hgb  13.1  


 


Hct  38.9  


 


MCV  93  


 


MCH  31  


 


MCHC  34  


 


RDW  13.5  


 


Plt Count  370  


 


Lymph % (Auto)  7.5 L  


 


Mono % (Auto)  10.3 H  


 


Eos % (Auto)  0.0  


 


Baso % (Auto)  0.2  


 


Lymph #  0.9 L  


 


Mono #  1.2 H  


 


Eos #  0.0  


 


Baso #  0.0  


 


Seg Neutrophils %  82.0 H  


 


Seg Neutrophils #  10.0 H  


 


PT   13.7 


 


INR   0.99 


 


APTT   


 


VBG pH   


 


Sodium    135 L


 


Potassium    4.3


 


Chloride    93.2 L


 


Carbon Dioxide    21 L


 


Anion Gap    25


 


BUN    55 H


 


Creatinine    1.6 H


 


Estimated GFR    45


 


BUN/Creatinine Ratio    34


 


Glucose    157 H


 


POC Glucose   


 


Lactic Acid   


 


Calcium    9.5


 


Magnesium   


 


Total Bilirubin    0.60


 


AST    7


 


ALT    < 5 L


 


Alkaline Phosphatase    62


 


Ammonia   


 


Total Creatine Kinase    18 L


 


Troponin T    < 0.010


 


Total Protein    8.4 H


 


Albumin    4.1


 


Albumin/Globulin Ratio    1.0


 


TSH   


 


Free T4   


 


Urine Color   


 


Urine Turbidity   


 


Urine pH   


 


Ur Specific Gravity   


 


Urine Protein   


 


Urine Glucose (UA)   


 


Urine Ketones   


 


Urine Blood   


 


Urine Nitrite   


 


Urine Bilirubin   


 


Urine Urobilinogen   


 


Ur Leukocyte Esterase   


 


Urine WBC (Auto)   


 


Urine RBC (Auto)   


 


U Epithel Cells (Auto)   


 


Urine Bacteria (Auto)   


 


Urine WBC Clumps   


 


Urine Mucus   


 


Salicylates   


 


Urine Opiates Screen   


 


Urine Methadone Screen   


 


Acetaminophen   


 


Ur Barbiturates Screen   


 


Ur Phencyclidine Scrn   


 


Ur Amphetamines Screen   


 


U Benzodiazepines Scrn   


 


Urine Cocaine Screen   


 


U Marijuana (THC) Screen   


 


Drugs of Abuse Note   


 


Plasma/Serum Alcohol   


 


HIV 1&2 Antibody Rapid   


 


HIV P24 Antigen   














  02/21/19 02/21/19 02/21/19





  12:02 12:02 12:02


 


WBC   


 


RBC   


 


Hgb   


 


Hct   


 


MCV   


 


MCH   


 


MCHC   


 


RDW   


 


Plt Count   


 


Lymph % (Auto)   


 


Mono % (Auto)   


 


Eos % (Auto)   


 


Baso % (Auto)   


 


Lymph #   


 


Mono #   


 


Eos #   


 


Baso #   


 


Seg Neutrophils %   


 


Seg Neutrophils #   


 


PT   


 


INR   


 


APTT   


 


VBG pH   


 


Sodium   


 


Potassium   


 


Chloride   


 


Carbon Dioxide   


 


Anion Gap   


 


BUN   


 


Creatinine   


 


Estimated GFR   


 


BUN/Creatinine Ratio   


 


Glucose   


 


POC Glucose   


 


Lactic Acid  3.60 H*  


 


Calcium   


 


Magnesium   


 


Total Bilirubin   


 


AST   


 


ALT   


 


Alkaline Phosphatase   


 


Ammonia   78.0 H 


 


Total Creatine Kinase   


 


Troponin T   


 


Total Protein   


 


Albumin   


 


Albumin/Globulin Ratio   


 


TSH    0.969


 


Free T4   


 


Urine Color   


 


Urine Turbidity   


 


Urine pH   


 


Ur Specific Gravity   


 


Urine Protein   


 


Urine Glucose (UA)   


 


Urine Ketones   


 


Urine Blood   


 


Urine Nitrite   


 


Urine Bilirubin   


 


Urine Urobilinogen   


 


Ur Leukocyte Esterase   


 


Urine WBC (Auto)   


 


Urine RBC (Auto)   


 


U Epithel Cells (Auto)   


 


Urine Bacteria (Auto)   


 


Urine WBC Clumps   


 


Urine Mucus   


 


Salicylates   


 


Urine Opiates Screen   


 


Urine Methadone Screen   


 


Acetaminophen   


 


Ur Barbiturates Screen   


 


Ur Phencyclidine Scrn   


 


Ur Amphetamines Screen   


 


U Benzodiazepines Scrn   


 


Urine Cocaine Screen   


 


U Marijuana (THC) Screen   


 


Drugs of Abuse Note   


 


Plasma/Serum Alcohol   


 


HIV 1&2 Antibody Rapid   


 


HIV P24 Antigen   














  02/21/19 02/21/19 02/21/19





  12:02 12:02 12:02


 


WBC   


 


RBC   


 


Hgb   


 


Hct   


 


MCV   


 


MCH   


 


MCHC   


 


RDW   


 


Plt Count   


 


Lymph % (Auto)   


 


Mono % (Auto)   


 


Eos % (Auto)   


 


Baso % (Auto)   


 


Lymph #   


 


Mono #   


 


Eos #   


 


Baso #   


 


Seg Neutrophils %   


 


Seg Neutrophils #   


 


PT   


 


INR   


 


APTT   


 


VBG pH   


 


Sodium   


 


Potassium   


 


Chloride   


 


Carbon Dioxide   


 


Anion Gap   


 


BUN   


 


Creatinine   


 


Estimated GFR   


 


BUN/Creatinine Ratio   


 


Glucose   


 


POC Glucose   


 


Lactic Acid   


 


Calcium   


 


Magnesium   


 


Total Bilirubin   


 


AST   


 


ALT   


 


Alkaline Phosphatase   


 


Ammonia   


 


Total Creatine Kinase   


 


Troponin T   


 


Total Protein   


 


Albumin   


 


Albumin/Globulin Ratio   


 


TSH   


 


Free T4   


 


Urine Color   


 


Urine Turbidity   


 


Urine pH   


 


Ur Specific Gravity   


 


Urine Protein   


 


Urine Glucose (UA)   


 


Urine Ketones   


 


Urine Blood   


 


Urine Nitrite   


 


Urine Bilirubin   


 


Urine Urobilinogen   


 


Ur Leukocyte Esterase   


 


Urine WBC (Auto)   


 


Urine RBC (Auto)   


 


U Epithel Cells (Auto)   


 


Urine Bacteria (Auto)   


 


Urine WBC Clumps   


 


Urine Mucus   


 


Salicylates  < 0.3 L  


 


Urine Opiates Screen   


 


Urine Methadone Screen   


 


Acetaminophen   < 5.0 L 


 


Ur Barbiturates Screen   


 


Ur Phencyclidine Scrn   


 


Ur Amphetamines Screen   


 


U Benzodiazepines Scrn   


 


Urine Cocaine Screen   


 


U Marijuana (THC) Screen   


 


Drugs of Abuse Note   


 


Plasma/Serum Alcohol    < 0.01


 


HIV 1&2 Antibody Rapid   


 


HIV P24 Antigen   














  02/21/19 02/21/19 02/21/19





  12:02 12:02 12:02


 


WBC   


 


RBC   


 


Hgb   


 


Hct   


 


MCV   


 


MCH   


 


MCHC   


 


RDW   


 


Plt Count   


 


Lymph % (Auto)   


 


Mono % (Auto)   


 


Eos % (Auto)   


 


Baso % (Auto)   


 


Lymph #   


 


Mono #   


 


Eos #   


 


Baso #   


 


Seg Neutrophils %   


 


Seg Neutrophils #   


 


PT   


 


INR   


 


APTT    27.9


 


VBG pH   7.392 


 


Sodium   


 


Potassium   


 


Chloride   


 


Carbon Dioxide   


 


Anion Gap   


 


BUN   


 


Creatinine   


 


Estimated GFR   


 


BUN/Creatinine Ratio   


 


Glucose   


 


POC Glucose   


 


Lactic Acid   


 


Calcium   


 


Magnesium  2.70 H  


 


Total Bilirubin   


 


AST   


 


ALT   


 


Alkaline Phosphatase   


 


Ammonia   


 


Total Creatine Kinase   


 


Troponin T   


 


Total Protein   


 


Albumin   


 


Albumin/Globulin Ratio   


 


TSH   


 


Free T4   


 


Urine Color   


 


Urine Turbidity   


 


Urine pH   


 


Ur Specific Gravity   


 


Urine Protein   


 


Urine Glucose (UA)   


 


Urine Ketones   


 


Urine Blood   


 


Urine Nitrite   


 


Urine Bilirubin   


 


Urine Urobilinogen   


 


Ur Leukocyte Esterase   


 


Urine WBC (Auto)   


 


Urine RBC (Auto)   


 


U Epithel Cells (Auto)   


 


Urine Bacteria (Auto)   


 


Urine WBC Clumps   


 


Urine Mucus   


 


Salicylates   


 


Urine Opiates Screen   


 


Urine Methadone Screen   


 


Acetaminophen   


 


Ur Barbiturates Screen   


 


Ur Phencyclidine Scrn   


 


Ur Amphetamines Screen   


 


U Benzodiazepines Scrn   


 


Urine Cocaine Screen   


 


U Marijuana (THC) Screen   


 


Drugs of Abuse Note   


 


Plasma/Serum Alcohol   


 


HIV 1&2 Antibody Rapid   


 


HIV P24 Antigen   














  02/21/19 02/21/19 02/21/19





  12:02 12:02 12:46


 


WBC   


 


RBC   


 


Hgb   


 


Hct   


 


MCV   


 


MCH   


 


MCHC   


 


RDW   


 


Plt Count   


 


Lymph % (Auto)   


 


Mono % (Auto)   


 


Eos % (Auto)   


 


Baso % (Auto)   


 


Lymph #   


 


Mono #   


 


Eos #   


 


Baso #   


 


Seg Neutrophils %   


 


Seg Neutrophils #   


 


PT   


 


INR   


 


APTT   


 


VBG pH   


 


Sodium   


 


Potassium   


 


Chloride   


 


Carbon Dioxide   


 


Anion Gap   


 


BUN   


 


Creatinine   


 


Estimated GFR   


 


BUN/Creatinine Ratio   


 


Glucose   


 


POC Glucose   


 


Lactic Acid   


 


Calcium   


 


Magnesium   


 


Total Bilirubin   


 


AST   


 


ALT   


 


Alkaline Phosphatase   


 


Ammonia   


 


Total Creatine Kinase   


 


Troponin T   


 


Total Protein   


 


Albumin   


 


Albumin/Globulin Ratio   


 


TSH   0.966 


 


Free T4   1.35 


 


Urine Color    Korin


 


Urine Turbidity    Turbid


 


Urine pH    8.0 H


 


Ur Specific Gravity    1.012


 


Urine Protein    >2000 mg dl


 


Urine Glucose (UA)    Neg


 


Urine Ketones    Neg


 


Urine Blood    Neg


 


Urine Nitrite    Neg


 


Urine Bilirubin    Neg


 


Urine Urobilinogen    < 2.0


 


Ur Leukocyte Esterase    Lg


 


Urine WBC (Auto)    150.0 H


 


Urine RBC (Auto)    44.0


 


U Epithel Cells (Auto)    < 1.0


 


Urine Bacteria (Auto)    2+


 


Urine WBC Clumps    2+


 


Urine Mucus    2+


 


Salicylates   


 


Urine Opiates Screen   


 


Urine Methadone Screen   


 


Acetaminophen   


 


Ur Barbiturates Screen   


 


Ur Phencyclidine Scrn   


 


Ur Amphetamines Screen   


 


U Benzodiazepines Scrn   


 


Urine Cocaine Screen   


 


U Marijuana (THC) Screen   


 


Drugs of Abuse Note   


 


Plasma/Serum Alcohol   


 


HIV 1&2 Antibody Rapid  Non react  


 


HIV P24 Antigen  Non react  














  02/21/19 02/21/19 02/21/19





  12:46 16:14 21:29


 


WBC   


 


RBC   


 


Hgb   


 


Hct   


 


MCV   


 


MCH   


 


MCHC   


 


RDW   


 


Plt Count   


 


Lymph % (Auto)   


 


Mono % (Auto)   


 


Eos % (Auto)   


 


Baso % (Auto)   


 


Lymph #   


 


Mono #   


 


Eos #   


 


Baso #   


 


Seg Neutrophils %   


 


Seg Neutrophils #   


 


PT   


 


INR   


 


APTT   


 


VBG pH   


 


Sodium   


 


Potassium   


 


Chloride   


 


Carbon Dioxide   


 


Anion Gap   


 


BUN   


 


Creatinine   


 


Estimated GFR   


 


BUN/Creatinine Ratio   


 


Glucose   


 


POC Glucose   


 


Lactic Acid   2.70 H*  1.40


 


Calcium   


 


Magnesium   


 


Total Bilirubin   


 


AST   


 


ALT   


 


Alkaline Phosphatase   


 


Ammonia   


 


Total Creatine Kinase   


 


Troponin T   


 


Total Protein   


 


Albumin   


 


Albumin/Globulin Ratio   


 


TSH   


 


Free T4   


 


Urine Color   


 


Urine Turbidity   


 


Urine pH   


 


Ur Specific Gravity   


 


Urine Protein   


 


Urine Glucose (UA)   


 


Urine Ketones   


 


Urine Blood   


 


Urine Nitrite   


 


Urine Bilirubin   


 


Urine Urobilinogen   


 


Ur Leukocyte Esterase   


 


Urine WBC (Auto)   


 


Urine RBC (Auto)   


 


U Epithel Cells (Auto)   


 


Urine Bacteria (Auto)   


 


Urine WBC Clumps   


 


Urine Mucus   


 


Salicylates   


 


Urine Opiates Screen  Presumptive negative  


 


Urine Methadone Screen  Presumptive negative  


 


Acetaminophen   


 


Ur Barbiturates Screen  Presumptive negative  


 


Ur Phencyclidine Scrn  Presumptive negative  


 


Ur Amphetamines Screen  Presumptive negative  


 


U Benzodiazepines Scrn  Presumptive negative  


 


Urine Cocaine Screen  Presumptive negative  


 


U Marijuana (THC) Screen  Presumptive negative  


 


Drugs of Abuse Note  Disclamer  


 


Plasma/Serum Alcohol   


 


HIV 1&2 Antibody Rapid   


 


HIV P24 Antigen   














  02/21/19 02/21/19 02/22/19





  22:57 23:04 05:00


 


WBC    9.4


 


RBC    3.38 L


 


Hgb    10.7 L


 


Hct    33.4 L


 


MCV    93


 


MCH    32


 


MCHC    34


 


RDW    13.3


 


Plt Count    308


 


Lymph % (Auto)    13.9


 


Mono % (Auto)    11.8 H


 


Eos % (Auto)    0.2


 


Baso % (Auto)    0.2


 


Lymph #    1.3


 


Mono #    1.1 H


 


Eos #    0.0


 


Baso #    0.0


 


Seg Neutrophils %    73.9 H


 


Seg Neutrophils #    7.0


 


PT   


 


INR   


 


APTT   


 


VBG pH   


 


Sodium   


 


Potassium   


 


Chloride   


 


Carbon Dioxide   


 


Anion Gap   


 


BUN   


 


Creatinine   


 


Estimated GFR   


 


BUN/Creatinine Ratio   


 


Glucose   


 


POC Glucose   108 H 


 


Lactic Acid  1.50  


 


Calcium   


 


Magnesium   


 


Total Bilirubin   


 


AST   


 


ALT   


 


Alkaline Phosphatase   


 


Ammonia   


 


Total Creatine Kinase   


 


Troponin T   


 


Total Protein   


 


Albumin   


 


Albumin/Globulin Ratio   


 


TSH   


 


Free T4   


 


Urine Color   


 


Urine Turbidity   


 


Urine pH   


 


Ur Specific Gravity   


 


Urine Protein   


 


Urine Glucose (UA)   


 


Urine Ketones   


 


Urine Blood   


 


Urine Nitrite   


 


Urine Bilirubin   


 


Urine Urobilinogen   


 


Ur Leukocyte Esterase   


 


Urine WBC (Auto)   


 


Urine RBC (Auto)   


 


U Epithel Cells (Auto)   


 


Urine Bacteria (Auto)   


 


Urine WBC Clumps   


 


Urine Mucus   


 


Salicylates   


 


Urine Opiates Screen   


 


Urine Methadone Screen   


 


Acetaminophen   


 


Ur Barbiturates Screen   


 


Ur Phencyclidine Scrn   


 


Ur Amphetamines Screen   


 


U Benzodiazepines Scrn   


 


Urine Cocaine Screen   


 


U Marijuana (THC) Screen   


 


Drugs of Abuse Note   


 


Plasma/Serum Alcohol   


 


HIV 1&2 Antibody Rapid   


 


HIV P24 Antigen   














  02/22/19





  05:00


 


WBC 


 


RBC 


 


Hgb 


 


Hct 


 


MCV 


 


MCH 


 


MCHC 


 


RDW 


 


Plt Count 


 


Lymph % (Auto) 


 


Mono % (Auto) 


 


Eos % (Auto) 


 


Baso % (Auto) 


 


Lymph # 


 


Mono # 


 


Eos # 


 


Baso # 


 


Seg Neutrophils % 


 


Seg Neutrophils # 


 


PT 


 


INR 


 


APTT 


 


VBG pH 


 


Sodium  136 L


 


Potassium  4.1


 


Chloride  103.7


 


Carbon Dioxide  19 L


 


Anion Gap  17


 


BUN  52 H


 


Creatinine  1.1


 


Estimated GFR  > 60


 


BUN/Creatinine Ratio  47


 


Glucose  126 H


 


POC Glucose 


 


Lactic Acid 


 


Calcium  8.4


 


Magnesium 


 


Total Bilirubin  0.50


 


AST  8


 


ALT  < 5 L


 


Alkaline Phosphatase  45


 


Ammonia 


 


Total Creatine Kinase 


 


Troponin T 


 


Total Protein  6.5  D


 


Albumin  3.1 L


 


Albumin/Globulin Ratio  0.9


 


TSH 


 


Free T4 


 


Urine Color 


 


Urine Turbidity 


 


Urine pH 


 


Ur Specific Gravity 


 


Urine Protein 


 


Urine Glucose (UA) 


 


Urine Ketones 


 


Urine Blood 


 


Urine Nitrite 


 


Urine Bilirubin 


 


Urine Urobilinogen 


 


Ur Leukocyte Esterase 


 


Urine WBC (Auto) 


 


Urine RBC (Auto) 


 


U Epithel Cells (Auto) 


 


Urine Bacteria (Auto) 


 


Urine WBC Clumps 


 


Urine Mucus 


 


Salicylates 


 


Urine Opiates Screen 


 


Urine Methadone Screen 


 


Acetaminophen 


 


Ur Barbiturates Screen 


 


Ur Phencyclidine Scrn 


 


Ur Amphetamines Screen 


 


U Benzodiazepines Scrn 


 


Urine Cocaine Screen 


 


U Marijuana (THC) Screen 


 


Drugs of Abuse Note 


 


Plasma/Serum Alcohol 


 


HIV 1&2 Antibody Rapid 


 


HIV P24 Antigen 














Assessment and Plan


1.GI bleed


 2.rectal bleeding?


-plt WNL, INR 0.99


-LFTs WNL


-H/H WNL on admission, now 10.7/33.4 (likely due to hemodilution)


-continue to monitor H/H and transfuse as needed


-no active signs of bleeding overnight or this am- BM x 1 this am with brown 

stool per nursing


-no plan for scope at this time, unless overt bleeding develops


-continue PPI and supportive care


-will follow


2.encephalopathy


3.ARF


4.UTI


5.ETOH dependence- watch for signs of withdrawal

## 2019-02-22 NOTE — PROGRESS NOTE
Assessment and Plan





/ Sepsis due to UTI


Sepsis protocol: IV antibiotic therapy, monitor uop q shift, serial lactic acid,

IVF resuscitation therapy, follow Cx.





/ GI bleed: 


GI consulted in ED, IV ppi therapy, monitor cbc,  supportive care. 





/ Encephalopathy


likely from alcohol withdrawl


CT Head, neuro check, seizure precautions, aspiration precautions, treat sepsis.





/ ARF (acute renal failure) with vasomotor nephropathy


IVF resuscitation, monitor uop q shift, urine electrolytes, repeat bmp, monitor 

serum creatnine.





/ UTI (urinary tract infection)


IV antibiotic therapy, supportive care, urine Cx





/ EtOH dependence


Thiamine, Folic acid, multivitamin, ciwa protocol.





/ DVT prophylaxis 


SCD to BLE while in bed





Brief History:


Patient is a 53 y/o male with PMH of ETOH dependence, COPD, and malnutrition who

presented to ED with AMS and was found to have enceophalopathy, sepsis 2/2 UTI, 

and acute renal failure upon admission. There were also reports of rectal 

bleeding per family, GI has been consulted. Patient on alcohol withdrawl 

protocol, restraint, no plan for endoscopy now. 














Subjective


Date of service: 02/22/19


Interval history: 





patient seen and examined


confused, on restraint


no family at bedside











Objective





- Constitutional


Vitals: 


                               Vital Signs - 12hr











  02/22/19 02/22/19 02/22/19





  04:00 07:36 11:26


 


Temperature   98.3 F


 


Pulse Rate [ 101 H  





From Monitor]   


 


Respiratory 21  





Rate   


 


O2 Sat by Pulse 98 99 





Oximetry   











General appearance: Present: no acute distress





- EENT


Eyes: PERRL, EOM intact


ENT: hearing intact, clear oral mucosa


Ears: bilateral: normal





- Neck


Neck: supple, normal ROM





- Respiratory


Respiratory effort: normal


Respiratory: bilateral: CTA





- Cardiovascular


Rhythm: regular


Heart Sounds: Present: S1 & S2.  Absent: gallop, rub


Extremities: pulses intact, No edema, normal color, Full ROM





- Gastrointestinal


General gastrointestinal: Present: soft, non-tender, non-distended, normal bowel

sounds





- Integumentary


Integumentary: clear, warm, dry





- Musculoskeletal


Musculoskeletal: 1, strength equal bilaterally





- Neurologic


Neurologic: moves all extremities





- Psychiatric


Psychiatric: no appropriate mood/affect, no intact judgment & insight, no memory

intact, other (confused)





- Labs


CBC & Chem 7: 


                                 02/23/19 05:19





                                 02/23/19 05:19


Labs: 


                              Abnormal lab results











  02/21/19 02/21/19 02/21/19 Range/Units





  12:02 12:02 12:02 


 


WBC  12.2 H    (4.5-11.0)  K/mm3


 


RBC     (3.65-5.03)  M/mm3


 


Hgb     (11.8-15.2)  gm/dl


 


Hct     (35.5-45.6)  %


 


Lymph % (Auto)  7.5 L    (13.4-35.0)  %


 


Mono % (Auto)  10.3 H    (0.0-7.3)  %


 


Lymph #  0.9 L    (1.2-5.4)  K/mm3


 


Mono #  1.2 H    (0.0-0.8)  K/mm3


 


Seg Neutrophils %  82.0 H    (40.0-70.0)  %


 


Seg Neutrophils #  10.0 H    (1.8-7.7)  K/mm3


 


Sodium   135 L   (137-145)  mmol/L


 


Chloride   93.2 L   ()  mmol/L


 


Carbon Dioxide   21 L   (22-30)  mmol/L


 


BUN   55 H   (9-20)  mg/dL


 


Creatinine   1.6 H   (0.8-1.5)  mg/dL


 


Glucose   157 H   ()  mg/dL


 


POC Glucose     ()  


 


Lactic Acid    3.60 H*  (0.7-2.0)  mmol/L


 


Magnesium     (1.7-2.3)  mg/dL


 


ALT   < 5 L   (7-56)  units/L


 


Ammonia     (25-60)  umol/L


 


Total Creatine Kinase   18 L   ()  units/L


 


Total Protein   8.4 H   (6.3-8.2)  g/dL


 


Albumin     (3.9-5)  g/dL


 


Urine pH     (5.0-7.0)  


 


Urine WBC (Auto)     (0.0-6.0)  /HPF


 


Salicylates     (2.8-20.0)  mg/dL


 


Acetaminophen     (10.0-30.0)  ug/mL














  02/21/19 02/21/19 02/21/19 Range/Units





  12:02 12:02 12:02 


 


WBC     (4.5-11.0)  K/mm3


 


RBC     (3.65-5.03)  M/mm3


 


Hgb     (11.8-15.2)  gm/dl


 


Hct     (35.5-45.6)  %


 


Lymph % (Auto)     (13.4-35.0)  %


 


Mono % (Auto)     (0.0-7.3)  %


 


Lymph #     (1.2-5.4)  K/mm3


 


Mono #     (0.0-0.8)  K/mm3


 


Seg Neutrophils %     (40.0-70.0)  %


 


Seg Neutrophils #     (1.8-7.7)  K/mm3


 


Sodium     (137-145)  mmol/L


 


Chloride     ()  mmol/L


 


Carbon Dioxide     (22-30)  mmol/L


 


BUN     (9-20)  mg/dL


 


Creatinine     (0.8-1.5)  mg/dL


 


Glucose     ()  mg/dL


 


POC Glucose     ()  


 


Lactic Acid     (0.7-2.0)  mmol/L


 


Magnesium     (1.7-2.3)  mg/dL


 


ALT     (7-56)  units/L


 


Ammonia  78.0 H    (25-60)  umol/L


 


Total Creatine Kinase     ()  units/L


 


Total Protein     (6.3-8.2)  g/dL


 


Albumin     (3.9-5)  g/dL


 


Urine pH     (5.0-7.0)  


 


Urine WBC (Auto)     (0.0-6.0)  /HPF


 


Salicylates   < 0.3 L   (2.8-20.0)  mg/dL


 


Acetaminophen    < 5.0 L  (10.0-30.0)  ug/mL














  02/21/19 02/21/19 02/21/19 Range/Units





  12:02 12:46 16:14 


 


WBC     (4.5-11.0)  K/mm3


 


RBC     (3.65-5.03)  M/mm3


 


Hgb     (11.8-15.2)  gm/dl


 


Hct     (35.5-45.6)  %


 


Lymph % (Auto)     (13.4-35.0)  %


 


Mono % (Auto)     (0.0-7.3)  %


 


Lymph #     (1.2-5.4)  K/mm3


 


Mono #     (0.0-0.8)  K/mm3


 


Seg Neutrophils %     (40.0-70.0)  %


 


Seg Neutrophils #     (1.8-7.7)  K/mm3


 


Sodium     (137-145)  mmol/L


 


Chloride     ()  mmol/L


 


Carbon Dioxide     (22-30)  mmol/L


 


BUN     (9-20)  mg/dL


 


Creatinine     (0.8-1.5)  mg/dL


 


Glucose     ()  mg/dL


 


POC Glucose     ()  


 


Lactic Acid    2.70 H*  (0.7-2.0)  mmol/L


 


Magnesium  2.70 H    (1.7-2.3)  mg/dL


 


ALT     (7-56)  units/L


 


Ammonia     (25-60)  umol/L


 


Total Creatine Kinase     ()  units/L


 


Total Protein     (6.3-8.2)  g/dL


 


Albumin     (3.9-5)  g/dL


 


Urine pH   8.0 H   (5.0-7.0)  


 


Urine WBC (Auto)   150.0 H   (0.0-6.0)  /HPF


 


Salicylates     (2.8-20.0)  mg/dL


 


Acetaminophen     (10.0-30.0)  ug/mL














  02/21/19 02/22/19 02/22/19 Range/Units





  23:04 05:00 05:00 


 


WBC     (4.5-11.0)  K/mm3


 


RBC   3.38 L   (3.65-5.03)  M/mm3


 


Hgb   10.7 L   (11.8-15.2)  gm/dl


 


Hct   33.4 L   (35.5-45.6)  %


 


Lymph % (Auto)     (13.4-35.0)  %


 


Mono % (Auto)   11.8 H   (0.0-7.3)  %


 


Lymph #     (1.2-5.4)  K/mm3


 


Mono #   1.1 H   (0.0-0.8)  K/mm3


 


Seg Neutrophils %   73.9 H   (40.0-70.0)  %


 


Seg Neutrophils #     (1.8-7.7)  K/mm3


 


Sodium    136 L  (137-145)  mmol/L


 


Chloride     ()  mmol/L


 


Carbon Dioxide    19 L  (22-30)  mmol/L


 


BUN    52 H  (9-20)  mg/dL


 


Creatinine     (0.8-1.5)  mg/dL


 


Glucose    126 H  ()  mg/dL


 


POC Glucose  108 H    ()  


 


Lactic Acid     (0.7-2.0)  mmol/L


 


Magnesium     (1.7-2.3)  mg/dL


 


ALT    < 5 L  (7-56)  units/L


 


Ammonia     (25-60)  umol/L


 


Total Creatine Kinase     ()  units/L


 


Total Protein     (6.3-8.2)  g/dL


 


Albumin    3.1 L  (3.9-5)  g/dL


 


Urine pH     (5.0-7.0)  


 


Urine WBC (Auto)     (0.0-6.0)  /HPF


 


Salicylates     (2.8-20.0)  mg/dL


 


Acetaminophen     (10.0-30.0)  ug/mL

## 2019-02-23 NOTE — PROGRESS NOTE
Assessment and Plan





/ Sepsis due to UTI


Sepsis protocol: cont IV antibiotic therapy, monitor uop q shift, IVF 

resuscitation therapy, follow Cx.





/ GI bleed: 


GI consulted in ED, cont IV ppi therapy, stable h/h,  supportive care. No plan 

for endoscopy





/ Acute toxic Encephalopathy


likely from alcohol withdrawl


CT Head no acute finding, cont neuro check, seizure precautions, CIWA protocol, 

aspiration precautions, treat sepsis.








/ ARF (acute renal failure) with vasomotor nephropathy


cont IVF resuscitation, monitor uop q shift, repeat bmp in the am





/ UTI (urinary tract infection)


cont IV antibiotic therapy, supportive care, urine Cx





/ EtOH dependence with intoxication, now on withdrawal


has h/o heavy alcohol drinking


cont Thiamine, Folic acid, multivitamin, CIWA protocol with librium and ativan 

as needed.





/ DVT prophylaxis 


SCD to BLE while in bed





Brief History:


Patient is a 53 y/o male with PMH of ETOH dependence, COPD, and malnutrition who

presented to ED with AMS and was found to have encephalopathy, sepsis 2/2 UTI, 

and acute renal failure upon admission. There were also reports of rectal 

bleeding per family, GI has been consulted. Patient on alcohol withdrawl 

protocol, restraint, no plan for endoscopy now. 








Physical exam:





General appearance: Present: no acute distress





- EENT


Eyes: PERRL, EOM intact


ENT: hearing intact, clear oral mucosa


Ears: bilateral: normal





- Neck


Neck: supple, normal ROM





- Respiratory


Respiratory effort: normal


Respiratory: bilateral: CTA





- Cardiovascular


Rhythm: regular


Heart Sounds: Present: S1 & S2.  Absent: gallop, rub


Extremities: pulses intact, No edema, normal color, Full ROM





- Gastrointestinal


General gastrointestinal: Present: soft, non-tender, non-distended, normal bowel

sounds





- Integumentary


Integumentary: clear, warm, dry





- Musculoskeletal


Musculoskeletal: 1, strength equal bilaterally





- Neurologic


Neurologic: moves all extremities





- Psychiatric


Psychiatric: no appropriate mood/affect, no intact judgment & insight, no memory

intact, other (confused)











Subjective


Date of service: 02/23/19


Principal diagnosis: hematochezia


Interval history: 





patient seen and examined


remained confused, on restraint


no family at bedside











Objective





- Constitutional


Vitals: 


                               Vital Signs - 12hr











  02/23/19 02/23/19 02/23/19





  01:11 01:21 01:31


 


Temperature   


 


Pulse Rate 86 84 88


 


Pulse Rate [   





From Monitor]   


 


Respiratory 20 18 19





Rate   


 


Blood Pressure 138/88 138/88 138/88


 


O2 Sat by Pulse 97 98 97





Oximetry   














  02/23/19 02/23/19 02/23/19





  01:41 01:51 02:00


 


Temperature   


 


Pulse Rate 88 97 H 110 H


 


Pulse Rate [   





From Monitor]   


 


Respiratory 17 19 29 H





Rate   


 


Blood Pressure 138/88 138/88 157/85


 


O2 Sat by Pulse 99 98 97





Oximetry   














  02/23/19 02/23/19 02/23/19





  02:11 02:21 02:31


 


Temperature   


 


Pulse Rate 87 86 89


 


Pulse Rate [   





From Monitor]   


 


Respiratory 18 17 17





Rate   


 


Blood Pressure 157/85 157/85 157/85


 


O2 Sat by Pulse 98 99 98





Oximetry   














  02/23/19 02/23/19 02/23/19





  02:41 02:51 03:01


 


Temperature   


 


Pulse Rate 92 H 84 83


 


Pulse Rate [   





From Monitor]   


 


Respiratory 18 16 16





Rate   


 


Blood Pressure 157/85 157/85 143/89


 


O2 Sat by Pulse 98 98 99





Oximetry   














  02/23/19 02/23/19 02/23/19





  03:11 03:21 03:31


 


Temperature   


 


Pulse Rate 88 87 85


 


Pulse Rate [   





From Monitor]   


 


Respiratory 18 17 15





Rate   


 


Blood Pressure 143/89 143/89 143/89


 


O2 Sat by Pulse 98 99 98





Oximetry   














  02/23/19 02/23/19 02/23/19





  03:41 03:51 04:00


 


Temperature   98.5 F


 


Pulse Rate 87 104 H 96 H


 


Pulse Rate [   97 H





From Monitor]   


 


Respiratory 17 21 18





Rate   


 


Blood Pressure 143/89 143/89 141/93


 


O2 Sat by Pulse 98 98 97





Oximetry   














  02/23/19 02/23/19 02/23/19





  04:11 04:21 04:31


 


Temperature   


 


Pulse Rate 87 98 H 97 H


 


Pulse Rate [   





From Monitor]   


 


Respiratory 17 16 23





Rate   


 


Blood Pressure 141/93 141/93 141/93


 


O2 Sat by Pulse 98 98 98





Oximetry   














  02/23/19 02/23/19 02/23/19





  04:41 04:51 05:00


 


Temperature   


 


Pulse Rate 91 H 92 H 98 H


 


Pulse Rate [   





From Monitor]   


 


Respiratory 15 19 13





Rate   


 


Blood Pressure 141/93 141/93 146/90


 


O2 Sat by Pulse 98 98 97





Oximetry   














  02/23/19 02/23/19 02/23/19





  05:11 05:21 05:31


 


Temperature   


 


Pulse Rate 92 H 91 H 103 H


 


Pulse Rate [   





From Monitor]   


 


Respiratory 20 22 14





Rate   


 


Blood Pressure 146/90 146/90 146/90


 


O2 Sat by Pulse 98 97 97





Oximetry   














  02/23/19 02/23/19 02/23/19





  05:41 05:51 06:00


 


Temperature   


 


Pulse Rate 89 91 H 96 H


 


Pulse Rate [   





From Monitor]   


 


Respiratory 14 13 19





Rate   


 


Blood Pressure 146/90 146/90 151/94


 


O2 Sat by Pulse 98 97 98





Oximetry   














  02/23/19 02/23/19 02/23/19





  06:11 06:21 06:31


 


Temperature   


 


Pulse Rate 97 H 98 H 85


 


Pulse Rate [   





From Monitor]   


 


Respiratory 19 28 H 18





Rate   


 


Blood Pressure 151/94 151/94 151/94


 


O2 Sat by Pulse 96 98 98





Oximetry   














  02/23/19 02/23/19 02/23/19





  06:41 06:51 07:00


 


Temperature   


 


Pulse Rate 89 87 87


 


Pulse Rate [   





From Monitor]   


 


Respiratory 17 20 18





Rate   


 


Blood Pressure 151/94 151/94 136/85


 


O2 Sat by Pulse 97 96 92





Oximetry   














  02/23/19 02/23/19 02/23/19





  07:10 07:21 07:31


 


Temperature   


 


Pulse Rate 88 101 H 91 H


 


Pulse Rate [   





From Monitor]   


 


Respiratory 17 20 16





Rate   


 


Blood Pressure 136/85 136/85 136/85


 


O2 Sat by Pulse 96 97 97





Oximetry   














  02/23/19 02/23/19 02/23/19





  07:41 07:51 08:00


 


Temperature   


 


Pulse Rate 87 101 H 93 H


 


Pulse Rate [   97 H





From Monitor]   


 


Respiratory 19 21 19





Rate   


 


Blood Pressure 136/85 136/85 150/95


 


O2 Sat by Pulse 99 98 98





Oximetry   














  02/23/19 02/23/19 02/23/19





  08:11 08:21 08:31


 


Temperature   


 


Pulse Rate 86 96 H 96 H


 


Pulse Rate [   





From Monitor]   


 


Respiratory 17 19 20





Rate   


 


Blood Pressure 150/95 150/95 150/95


 


O2 Sat by Pulse 98 96 100





Oximetry   














  02/23/19 02/23/19 02/23/19





  08:41 08:51 09:00


 


Temperature   


 


Pulse Rate 89 95 H 105 H


 


Pulse Rate [   





From Monitor]   


 


Respiratory 21 19 20





Rate   


 


Blood Pressure 150/95 150/95 142/101


 


O2 Sat by Pulse 98 99 





Oximetry   














  02/23/19 02/23/19 02/23/19





  09:11 09:21 09:31


 


Temperature   


 


Pulse Rate 91 H 113 H 109 H


 


Pulse Rate [   





From Monitor]   


 


Respiratory 17 26 H 22





Rate   


 


Blood Pressure 142/101 142/101 142/101


 


O2 Sat by Pulse 97 97 97





Oximetry   














  02/23/19 02/23/19 02/23/19





  09:41 09:51 10:00


 


Temperature   


 


Pulse Rate 99 H 91 H 109 H


 


Pulse Rate [   





From Monitor]   


 


Respiratory 20 17 20





Rate   


 


Blood Pressure 142/101 142/101 142/93


 


O2 Sat by Pulse 99 100 98





Oximetry   














  02/23/19 02/23/19 02/23/19





  10:11 10:21 10:31


 


Temperature   


 


Pulse Rate 88 96 H 90


 


Pulse Rate [   





From Monitor]   


 


Respiratory 18 18 17





Rate   


 


Blood Pressure 142/93 142/93 142/93


 


O2 Sat by Pulse 96 93 96





Oximetry   














  02/23/19 02/23/19





  11:04 12:00


 


Temperature  98.4 F


 


Pulse Rate  


 


Pulse Rate [ 97 H 





From Monitor]  


 


Respiratory 20 





Rate  


 


Blood Pressure  


 


O2 Sat by Pulse 95 





Oximetry  














- Labs


CBC & Chem 7: 


                                 02/23/19 05:19





                                 02/23/19 05:19


Labs: 


                              Abnormal lab results











  02/22/19 02/22/19 02/23/19 Range/Units





  16:06 21:58 05:19 


 


RBC    3.31 L  (3.65-5.03)  M/mm3


 


Hgb    10.5 L  (11.8-15.2)  gm/dl


 


Hct    30.8 L  (35.5-45.6)  %


 


Mono % (Auto)    14.3 H  (0.0-7.3)  %


 


Mono #    1.0 H  (0.0-0.8)  K/mm3


 


Carbon Dioxide     (22-30)  mmol/L


 


BUN     (9-20)  mg/dL


 


Glucose     ()  mg/dL


 


POC Glucose  124 H  112 H   ()  














  02/23/19 Range/Units





  05:19 


 


RBC   (3.65-5.03)  M/mm3


 


Hgb   (11.8-15.2)  gm/dl


 


Hct   (35.5-45.6)  %


 


Mono % (Auto)   (0.0-7.3)  %


 


Mono #   (0.0-0.8)  K/mm3


 


Carbon Dioxide  18 L  (22-30)  mmol/L


 


BUN  50 H  (9-20)  mg/dL


 


Glucose  118 H  ()  mg/dL


 


POC Glucose   ()

## 2019-02-23 NOTE — GASTROENTEROLOGY PROGRESS NOTE
Assessment and Plan


1. Hematochezia


2. Anemia


3. Sepsis





-H/H stable and no signs of bleeding since admission.  manage conservatively 

from gi stand point unless pt shows signs of further bleeding.  eventual 

colonoscopy can be done as outpatient once acute medical issues resolve.  will 

sign off, please call as needed or with questions. 








Subjective


Date of service: 02/23/19


Principal diagnosis: hematochezia


Interval history: 


pt seen and examined; non-verbal, in restraints, unable to provide history.  no 

bleeding episodes since admission.








Objective





- Exam


Narrative Exam: 


Gen: in restraints, non verbal


CV: RRR


Lungs: CTAB


Abd: soft, nt, nd, +bs








- Constitutional


Vitals: 


                                        











Temp Pulse Resp BP Pulse Ox


 


 98.4 F   97 H  20   142/93   95 


 


 02/23/19 12:00  02/23/19 11:04  02/23/19 11:04  02/23/19 10:31  02/23/19 11:04














- Labs


CBC & Chem 7: 


                                 02/23/19 05:19





                                 02/23/19 05:19


Labs: 


                         Laboratory Results - last 24 hr











  02/22/19 02/22/19 02/23/19





  16:06 21:58 05:19


 


WBC    7.2


 


RBC    3.31 L


 


Hgb    10.5 L


 


Hct    30.8 L


 


MCV    93


 


MCH    32


 


MCHC    34


 


RDW    13.5


 


Plt Count    321


 


Lymph % (Auto)    18.6


 


Mono % (Auto)    14.3 H


 


Eos % (Auto)    0.8


 


Baso % (Auto)    0.5


 


Lymph #    1.3


 


Mono #    1.0 H


 


Eos #    0.1


 


Baso #    0.0


 


Seg Neutrophils %    65.8


 


Seg Neutrophils #    4.7


 


Sodium   


 


Potassium   


 


Chloride   


 


Carbon Dioxide   


 


Anion Gap   


 


BUN   


 


Creatinine   


 


Estimated GFR   


 


BUN/Creatinine Ratio   


 


Glucose   


 


POC Glucose  124 H  112 H 


 


Calcium   














  02/23/19





  05:19


 


WBC 


 


RBC 


 


Hgb 


 


Hct 


 


MCV 


 


MCH 


 


MCHC 


 


RDW 


 


Plt Count 


 


Lymph % (Auto) 


 


Mono % (Auto) 


 


Eos % (Auto) 


 


Baso % (Auto) 


 


Lymph # 


 


Mono # 


 


Eos # 


 


Baso # 


 


Seg Neutrophils % 


 


Seg Neutrophils # 


 


Sodium  141


 


Potassium  3.7


 


Chloride  107.0


 


Carbon Dioxide  18 L


 


Anion Gap  20


 


BUN  50 H


 


Creatinine  1.3


 


Estimated GFR  58


 


BUN/Creatinine Ratio  38


 


Glucose  118 H


 


POC Glucose 


 


Calcium  8.6

## 2019-02-24 NOTE — PROGRESS NOTE
Assessment and Plan





/ Sepsis due to UTI


Sepsis protocol: cont IV antibiotic therapy, monitor uop q shift, IVF 

resuscitation therapy, follow Cx.





/ GI bleed: 


GI consulted in ED, cont IV ppi therapy, stable h/h,  supportive care. No plan 

for endoscopy





/ Acute toxic Encephalopathy


likely from alcohol withdrawl


CT Head no acute finding, cont neuro check, seizure precautions, CIWA protocol, 

aspiration precautions, treat sepsis.








/ ARF (acute renal failure) with vasomotor nephropathy


cont IVF resuscitation, monitor uop q shift, repeat bmp in the am





/ UTI (urinary tract infection)


cont IV antibiotic therapy, supportive care, urine Cx





/ EtOH dependence with intoxication, now on withdrawal


has h/o heavy alcohol drinking


cont Thiamine, Folic acid, multivitamin, CIWA protocol with librium and ativan 

as needed.





/ DVT prophylaxis 


SCD to BLE while in bed





Brief History:


Patient is a 51 y/o male with PMH of ETOH dependence, COPD, and malnutrition who

presented to ED with AMS and was found to have encephalopathy, sepsis 2/2 UTI, 

and acute renal failure upon admission. There were also reports of rectal 

bleeding per family, GI has been consulted. Patient on alcohol withdrawl 

protocol, restraint, no plan for endoscopy now. 








Physical exam:





General appearance: Present: no acute distress





- EENT


Eyes: PERRL, EOM intact


ENT: hearing intact, clear oral mucosa


Ears: bilateral: normal





- Neck


Neck: supple, normal ROM





- Respiratory


Respiratory effort: normal


Respiratory: bilateral: CTA





- Cardiovascular


Rhythm: regular


Heart Sounds: Present: S1 & S2.  Absent: gallop, rub


Extremities: pulses intact, No edema, normal color, Full ROM





- Gastrointestinal


General gastrointestinal: Present: soft, non-tender, non-distended, normal bowel

sounds





- Integumentary


Integumentary: clear, warm, dry





- Musculoskeletal


Musculoskeletal: 1, strength equal bilaterally





- Neurologic


Neurologic: moves all extremities





- Psychiatric


Psychiatric: no appropriate mood/affect, no intact judgment & insight, no memory

intact, other (confused)











Subjective


Date of service: 02/24/19


Principal diagnosis: hematochezia


Interval history: 





patient seen and examined


remained confused, off restraint


no family at bedside, communicates but not coherent











Objective





- Constitutional


Vitals: 


                               Vital Signs - 12hr











  02/23/19 02/23/19 02/23/19





  22:21 22:31 22:41


 


Temperature   


 


Pulse Rate 107 H 102 H 105 H


 


Pulse Rate [   





From Monitor]   


 


Respiratory 19 16 16





Rate   


 


Blood Pressure 144/88 144/88 144/88


 


O2 Sat by Pulse 96 94 96





Oximetry   














  02/23/19 02/23/19 02/23/19





  22:51 23:00 23:11


 


Temperature   


 


Pulse Rate 99 H 99 H 110 H


 


Pulse Rate [   





From Monitor]   


 


Respiratory 19 18 22





Rate   


 


Blood Pressure 144/88 133/91 133/91


 


O2 Sat by Pulse 97 97 96





Oximetry   














  02/23/19 02/23/19 02/23/19





  23:21 23:31 23:41


 


Temperature   


 


Pulse Rate   


 


Pulse Rate [   





From Monitor]   


 


Respiratory   





Rate   


 


Blood Pressure 133/91 133/91 133/91


 


O2 Sat by Pulse 98 98 97





Oximetry   














  02/23/19 02/24/19 02/24/19





  23:51 00:00 00:11


 


Temperature   


 


Pulse Rate 113 H 119 H 106 H


 


Pulse Rate [  99 H 





From Monitor]   


 


Respiratory  21 18





Rate   


 


Blood Pressure 133/91 120/84 120/84


 


O2 Sat by Pulse 98 96 97





Oximetry   














  02/24/19 02/24/19 02/24/19





  00:21 00:31 00:41


 


Temperature   


 


Pulse Rate 114 H 108 H 119 H


 


Pulse Rate [   





From Monitor]   


 


Respiratory 24 17 19





Rate   


 


Blood Pressure 120/84 120/84 120/84


 


O2 Sat by Pulse 96 96 96





Oximetry   














  02/24/19 02/24/19 02/24/19





  00:51 01:00 01:11


 


Temperature   


 


Pulse Rate 101 H 106 H 98 H


 


Pulse Rate [   





From Monitor]   


 


Respiratory 15 25 H 19





Rate   


 


Blood Pressure 120/84 171/129 171/129


 


O2 Sat by Pulse 95 96 94





Oximetry   














  02/24/19 02/24/19 02/24/19





  01:21 01:31 01:41


 


Temperature   


 


Pulse Rate 96 H 90 100 H


 


Pulse Rate [   





From Monitor]   


 


Respiratory 20 21 22





Rate   


 


Blood Pressure 171/129 171/129 171/129


 


O2 Sat by Pulse 89 89 92





Oximetry   














  02/24/19 02/24/19 02/24/19





  01:51 02:00 02:11


 


Temperature   


 


Pulse Rate 102 H 123 H 104 H


 


Pulse Rate [   





From Monitor]   


 


Respiratory 22 32 H 20





Rate   


 


Blood Pressure 171/129 137/84 137/84


 


O2 Sat by Pulse 90 90 97





Oximetry   














  02/24/19 02/24/19 02/24/19





  02:21 02:31 02:41


 


Temperature   


 


Pulse Rate 109 H 110 H 99 H


 


Pulse Rate [   





From Monitor]   


 


Respiratory 20 23 21





Rate   


 


Blood Pressure 137/84 137/84 137/84


 


O2 Sat by Pulse 95 93 93





Oximetry   














  02/24/19 02/24/19 02/24/19





  02:51 03:00 03:11


 


Temperature   


 


Pulse Rate 112 H 105 H 114 H


 


Pulse Rate [   





From Monitor]   


 


Respiratory 19 15 16





Rate   


 


Blood Pressure 137/84 140/85 140/85


 


O2 Sat by Pulse 93 94 95





Oximetry   














  02/24/19 02/24/19 02/24/19





  03:21 03:31 03:41


 


Temperature   


 


Pulse Rate 113 H 110 H 120 H


 


Pulse Rate [   





From Monitor]   


 


Respiratory 22 21 24





Rate   


 


Blood Pressure 140/85 140/85 140/85


 


O2 Sat by Pulse 98 97 95





Oximetry   














  02/24/19 02/24/19 02/24/19





  03:51 04:00 04:01


 


Temperature   


 


Pulse Rate 113 H  123 H


 


Pulse Rate [  114 H 





From Monitor]   


 


Respiratory 19 20 18





Rate   


 


Blood Pressure 140/85  134/94


 


O2 Sat by Pulse 97 97 97





Oximetry   














  02/24/19 02/24/19 02/24/19





  04:11 04:21 04:31


 


Temperature   


 


Pulse Rate 117 H 115 H 119 H


 


Pulse Rate [   





From Monitor]   


 


Respiratory 21 19 24





Rate   


 


Blood Pressure 134/94 134/94 134/94


 


O2 Sat by Pulse 97 93 98





Oximetry   














  02/24/19 02/24/19 02/24/19





  04:41 04:51 05:00


 


Temperature   


 


Pulse Rate 111 H 102 H 106 H


 


Pulse Rate [   





From Monitor]   


 


Respiratory 23 24 30 H





Rate   


 


Blood Pressure 134/94 134/94 147/87


 


O2 Sat by Pulse 97 98 97





Oximetry   














  02/24/19 02/24/19 02/24/19





  05:11 05:21 05:31


 


Temperature   


 


Pulse Rate 109 H 93 H 111 H


 


Pulse Rate [   





From Monitor]   


 


Respiratory 18 14 22





Rate   


 


Blood Pressure 147/87 147/87 147/87


 


O2 Sat by Pulse 99 94 94





Oximetry   














  02/24/19 02/24/19 02/24/19





  05:41 05:51 06:00


 


Temperature   


 


Pulse Rate 97 H  103 H


 


Pulse Rate [   





From Monitor]   


 


Respiratory 17  14





Rate   


 


Blood Pressure 147/87 147/87 154/81


 


O2 Sat by Pulse 97 97 96





Oximetry   














  02/24/19 02/24/19 02/24/19





  06:11 06:21 06:31


 


Temperature   


 


Pulse Rate 101 H 114 H 102 H


 


Pulse Rate [   





From Monitor]   


 


Respiratory 20 15 19





Rate   


 


Blood Pressure 154/81 154/81 154/81


 


O2 Sat by Pulse 97 97 97





Oximetry   














  02/24/19 02/24/19 02/24/19





  06:41 06:51 07:01


 


Temperature   


 


Pulse Rate 100 H 99 H 102 H


 


Pulse Rate [   





From Monitor]   


 


Respiratory 19 23 19





Rate   


 


Blood Pressure 154/81 154/81 150/126


 


O2 Sat by Pulse 96 96 78 L





Oximetry   














  02/24/19 02/24/19 02/24/19





  07:11 07:21 07:31


 


Temperature   


 


Pulse Rate 99 H 110 H 101 H


 


Pulse Rate [   





From Monitor]   


 


Respiratory 19 25 H 24





Rate   


 


Blood Pressure 150/126 150/126 150/126


 


O2 Sat by Pulse 97 96 97





Oximetry   














  02/24/19 02/24/19 02/24/19





  07:41 07:51 08:00


 


Temperature   98.3 F


 


Pulse Rate 117 H 115 H 


 


Pulse Rate [   114 H





From Monitor]   


 


Respiratory 23 25 H 20





Rate   


 


Blood Pressure 150/126 150/126 


 


O2 Sat by Pulse 99 98 97





Oximetry   














  02/24/19 02/24/19 02/24/19





  08:01 08:11 08:21


 


Temperature   


 


Pulse Rate 113 H 106 H 105 H


 


Pulse Rate [   





From Monitor]   


 


Respiratory 21 15 19





Rate   


 


Blood Pressure 138/60 138/60 138/60


 


O2 Sat by Pulse 99 97 99





Oximetry   














  02/24/19 02/24/19 02/24/19





  08:30 08:31 08:41


 


Temperature   


 


Pulse Rate  99 H 111 H


 


Pulse Rate [   





From Monitor]   


 


Respiratory  14 22





Rate   


 


Blood Pressure  138/60 138/60


 


O2 Sat by Pulse 97 97 99





Oximetry   














  02/24/19 02/24/19





  08:51 09:01


 


Temperature  


 


Pulse Rate 98 H 98 H


 


Pulse Rate [  





From Monitor]  


 


Respiratory 16 17





Rate  


 


Blood Pressure 138/60 152/90


 


O2 Sat by Pulse 97 98





Oximetry  














- Labs


CBC & Chem 7: 


                                 02/23/19 05:19





                                 02/23/19 05:19

## 2019-02-25 NOTE — CAT SCAN REPORT
FINAL REPORT



PROCEDURE:  CT HEAD/BRAIN WO CON



TECHNIQUE:  Computerized tomography of the head was performed without contrast material. 



HISTORY:  fall 



COMPARISON:  No prior studies are available for comparison.



FINDINGS:  

Skull and scalp: Normal.



Paranasal sinuses: Normal.



Ventricles and subarachnoid spaces: Normal.



Cerebrum: No evidence of hemorrhage, acute infarction or mass. Mild atrophy with periventricular and 
deep white matter diminished densities. 



Cerebellum and brainstem: No evidence of hemorrhage, acute infarction or mass.



Vasculature: Normal.



Comments: None.



IMPRESSION:  

Involutional change with atrophy and microangiopathy. No hemorrhage.

## 2019-02-25 NOTE — PROGRESS NOTE
Assessment and Plan





/ Sepsis due to UTI


Sepsis protocol: cont IV antibiotic therapy, monitor uop q shift, IVF 

resuscitation therapy, follow Cx.





/ GI bleed: 


GI consulted in ED, cont IV ppi therapy, stable h/h,  supportive care. No plan 

for endoscopy





/ Acute toxic Encephalopathy


likely from alcohol withdrawal


CT Head no acute finding, cont neuro check, seizure precautions, CIWA protocol, 

aspiration precautions, treat sepsis.


Consult psych








/ ARF (acute renal failure) with vasomotor nephropathy


cont IVF resuscitation, monitor uop q shift, repeat bmp in the am





/ UTI (urinary tract infection)


cont IV antibiotic therapy, supportive care, urine Cx





/ EtOH dependence with intoxication, now on withdrawal


has h/o heavy alcohol drinking


cont Thiamine, Folic acid, multivitamin, CIWA protocol with librium and ativan 

as needed.


will also consult psych





/ DVT prophylaxis 


SCD to BLE while in bed





Brief History:


Patient is a 51 y/o male with PMH of ETOH dependence, COPD, and malnutrition who

presented to ED with AMS and was found to have encephalopathy, sepsis 2/2 UTI, 

and acute renal failure upon admission. There were also reports of rectal 

bleeding per family, GI has been consulted. Patient on alcohol withdrawal 

protocol, restraint, no plan for endoscopy now. 








Physical exam:





General appearance: Present: no acute distress





- EENT


Eyes: PERRL, EOM intact


ENT: hearing intact, clear oral mucosa


Ears: bilateral: normal





- Neck


Neck: supple, normal ROM





- Respiratory


Respiratory effort: normal


Respiratory: bilateral: CTA





- Cardiovascular


Rhythm: regular


Heart Sounds: Present: S1 & S2.  Absent: gallop, rub


Extremities: pulses intact, No edema, normal color, Full ROM





- Gastrointestinal


General gastrointestinal: Present: soft, non-tender, non-distended, normal bowel

sounds





- Integumentary


Integumentary: clear, warm, dry





- Musculoskeletal


Musculoskeletal: 1, strength equal bilaterally





- Neurologic


Neurologic: moves all extremities





- Psychiatric


Psychiatric: no appropriate mood/affect, no intact judgment & insight, no memory

intact, other (confused)











Subjective


Date of service: 02/25/19


Principal diagnosis: hematochezia


Interval history: 





patient seen and examined


remained confused, off restraint


no family at bedside, communicates but not coherent











Objective





- Constitutional


Vitals: 


                               Vital Signs - 12hr











  02/25/19 02/25/19 02/25/19





  05:11 05:21 05:31


 


Temperature   


 


Pulse Rate 100 H 94 H 93 H


 


Pulse Rate [   





From Monitor]   


 


Respiratory 20 22 24





Rate   


 


Blood Pressure 131/93 131/93 131/93


 


O2 Sat by Pulse 99  82 L





Oximetry   














  02/25/19 02/25/19 02/25/19





  05:41 05:51 06:00


 


Temperature   


 


Pulse Rate 91 H 98 H 94 H


 


Pulse Rate [   





From Monitor]   


 


Respiratory 17 19 17





Rate   


 


Blood Pressure 135/93 135/93 123/81


 


O2 Sat by Pulse 97 97 98





Oximetry   














  02/25/19 02/25/19 02/25/19





  06:11 06:21 06:31


 


Temperature   


 


Pulse Rate 97 H 101 H 96 H


 


Pulse Rate [   





From Monitor]   


 


Respiratory 20 14 17





Rate   


 


Blood Pressure 123/81 123/81 123/81


 


O2 Sat by Pulse 97 97 





Oximetry   














  02/25/19 02/25/19 02/25/19





  06:41 06:51 07:00


 


Temperature   


 


Pulse Rate 97 H 89 95 H


 


Pulse Rate [   





From Monitor]   


 


Respiratory 21 17 19





Rate   


 


Blood Pressure 123/81 123/81 127/81


 


O2 Sat by Pulse  97 99





Oximetry   














  02/25/19 02/25/19 02/25/19





  07:11 07:21 07:31


 


Temperature   


 


Pulse Rate 92 H 103 H 95 H


 


Pulse Rate [   





From Monitor]   


 


Respiratory 17 23 19





Rate   


 


Blood Pressure 127/81 127/81 127/81


 


O2 Sat by Pulse 99 100 100





Oximetry   














  02/25/19 02/25/19 02/25/19





  07:39 07:41 07:51


 


Temperature   


 


Pulse Rate  100 H 96 H


 


Pulse Rate [   





From Monitor]   


 


Respiratory  14 19





Rate   


 


Blood Pressure  127/81 127/81


 


O2 Sat by Pulse 97 100 100





Oximetry   














  02/25/19 02/25/19 02/25/19





  08:00 08:11 08:21


 


Temperature 97.7 F  


 


Pulse Rate 99 H 98 H 98 H


 


Pulse Rate [ 98 H  





From Monitor]   


 


Respiratory 19 19 19





Rate   


 


Blood Pressure 123/82 123/82 123/82


 


O2 Sat by Pulse 99 100 100





Oximetry   














  02/25/19 02/25/19 02/25/19





  08:31 08:41 08:51


 


Temperature   


 


Pulse Rate 98 H  


 


Pulse Rate [   





From Monitor]   


 


Respiratory 17  





Rate   


 


Blood Pressure 123/82 123/82 123/82


 


O2 Sat by Pulse 98 97 99





Oximetry   














  02/25/19 02/25/19 02/25/19





  09:01 09:11 09:21


 


Temperature   


 


Pulse Rate   


 


Pulse Rate [   





From Monitor]   


 


Respiratory   





Rate   


 


Blood Pressure 127/81 127/81 127/81


 


O2 Sat by Pulse 98 98 99





Oximetry   














  02/25/19 02/25/19 02/25/19





  09:31 09:41 09:51


 


Temperature   


 


Pulse Rate  113 H 106 H


 


Pulse Rate [   





From Monitor]   


 


Respiratory  18 19





Rate   


 


Blood Pressure 127/81 127/81 127/81


 


O2 Sat by Pulse 99 100 98





Oximetry   














  02/25/19 02/25/19 02/25/19





  10:00 10:11 10:21


 


Temperature   


 


Pulse Rate 108 H 106 H 106 H


 


Pulse Rate [   





From Monitor]   


 


Respiratory 23 19 18





Rate   


 


Blood Pressure 126/76 126/76 126/76


 


O2 Sat by Pulse 98 97 100





Oximetry   














  02/25/19 02/25/19 02/25/19





  10:31 10:41 10:51


 


Temperature   


 


Pulse Rate 109 H 101 H 100 H


 


Pulse Rate [   





From Monitor]   


 


Respiratory 20 16 16





Rate   


 


Blood Pressure 126/76 126/76 126/76


 


O2 Sat by Pulse 100 99 98





Oximetry   














  02/25/19 02/25/19 02/25/19





  11:00 11:11 11:21


 


Temperature   


 


Pulse Rate 103 H 101 H 101 H


 


Pulse Rate [   





From Monitor]   


 


Respiratory 19 20 19





Rate   


 


Blood Pressure 114/74 114/74 114/74


 


O2 Sat by Pulse 98 98 98





Oximetry   














  02/25/19 02/25/19 02/25/19





  11:31 11:41 11:51


 


Temperature   


 


Pulse Rate 104 H 112 H 113 H


 


Pulse Rate [   





From Monitor]   


 


Respiratory 20 14 22





Rate   


 


Blood Pressure 114/74 114/74 114/74


 


O2 Sat by Pulse 98 99 100





Oximetry   














  02/25/19 02/25/19 02/25/19





  12:00 12:11 12:21


 


Temperature 100.1 F H  


 


Pulse Rate 110 H 111 H 102 H


 


Pulse Rate [ 100 H  





From Monitor]   


 


Respiratory 18 14 16





Rate   


 


Blood Pressure 129/85 129/85 129/85


 


O2 Sat by Pulse 100 99 100





Oximetry   














  02/25/19 02/25/19 02/25/19





  12:31 12:41 12:51


 


Temperature   


 


Pulse Rate 103 H 114 H 109 H


 


Pulse Rate [   





From Monitor]   


 


Respiratory 21 16 22





Rate   


 


Blood Pressure 129/85 129/85 129/85


 


O2 Sat by Pulse 100 97 97





Oximetry   














  02/25/19 02/25/19 02/25/19





  13:00 13:11 13:21


 


Temperature   


 


Pulse Rate 113 H 100 H 99 H


 


Pulse Rate [   





From Monitor]   


 


Respiratory 23 18 15





Rate   


 


Blood Pressure 113/83 113/83 113/83


 


O2 Sat by Pulse 98 99 99





Oximetry   














  02/25/19 02/25/19 02/25/19





  13:31 13:41 13:51


 


Temperature   


 


Pulse Rate 99 H 104 H 104 H


 


Pulse Rate [   





From Monitor]   


 


Respiratory 19 19 39 H





Rate   


 


Blood Pressure 113/83 113/83 113/83


 


O2 Sat by Pulse 99 98 99





Oximetry   














  02/25/19 02/25/19 02/25/19





  14:00 14:11 14:21


 


Temperature   


 


Pulse Rate 102 H 106 H 104 H


 


Pulse Rate [   





From Monitor]   


 


Respiratory 17 20 22





Rate   


 


Blood Pressure 118/91 118/91 118/91


 


O2 Sat by Pulse 99 100 100





Oximetry   














  02/25/19





  16:00


 


Temperature 98.2 F


 


Pulse Rate 


 


Pulse Rate [ 





From Monitor] 


 


Respiratory 





Rate 


 


Blood Pressure 


 


O2 Sat by Pulse 





Oximetry 














- Labs


CBC & Chem 7: 


                                 02/23/19 05:19





                                 02/23/19 05:19


Labs: 


                              Abnormal lab results











  02/24/19 Range/Units





  21:11 


 


POC Glucose  128 H  ()

## 2019-02-26 NOTE — PROGRESS NOTE
Assessment and Plan


Assessment and plan: 


Patient is a 51 yo man with PMH of ETOH dependence, COPD, and malnutrition who 

presented to ED with AMS and was found to have encephalopathy, sepsis 2/2 UTI, 

and acute renal failure upon admission. There were also reports of rectal 

bleeding per family, GI evaluated. Patient on alcohol withdrawal protocol, 

restraints, no plan for endoscopy now. 





/ EtOH dependence with intoxication, now in withdrawal: treat with Ciwa, did 

poorly with PT, has h/o heavy alcohol drinking, cont Thiamine, Folic acid, 

multivitamin, CIWA protocol with librium and ativan as needed.


/Sepsis due to UTI with suspected obstruction/Urinary retention: Sepsis 

protocol, cont IV antibiotic therapy, monitor uop q shift, IVF resuscitation 

therapy, follow Cx, ordered renal u/s


/GI bleed, resolved: GI consulted in ED, cont ppi therapy, stable h/h,  supporti

ve care. No plan for endoscopy


/Hematuria with Urinary Obstruction/Retention, PVR over 900 on 2/26/19, monae 

placed, now gross hematuria: consulted Urology


/Acute toxic Encephalopathy, likely from alcohol withdrawal, CT Head no acute 

finding, cont neuro check, seizure precautions, CIWA protocol, aspiration 

precautions, treat sepsis. Consult psych


/ARF (acute renal failure) with vasomotor nephropathy: cont IVF resuscitation, 

monitor uop q shift, repeat bmp in the am, renal u/s pending


/UTI (urinary tract infection) complicated/complex: cont IV antibiotic therapy, 

supportive care, urine Cx


/Severe malnutrition most likely, poa: Dietician consult


/DVT prophylaxis: SCD to BLE while in bed





D/W sister Jax [phone] who is visiting








History


Interval history: 


Patient was seen and examined. Follow-up on current diagnosis of AMS, still 

present. Overnight uneventful. Patient is confused. Imaging, nursing note, 

chart, labs and old chart reviewed. Discussed with patient. 





Hospitalist Physical





- Physical exam


Narrative exam: 


Gen: thin frail, NAD, Awake, Alert but confused


HEENT: NCAT, EOMI, PERRL, OP Clear 


Neck: supple, no adenopathy, no thyromegaly, no JVD 


CVS/Heart: Regular tachycardia, normal S1S2, pulses present bilaterally 


Chest/Lungs: CTA B, Symmetrical chest expansion, good air entry bilaterally 


GI/Abdomen: soft, NTND, good bowel sounds, no guarding or rebound 


/Bladder: no suprapubic tenderness, no CVA or paraspinal tenderness 


Extermity/Skin: no c/c/e, no obvious rash 


MSK: FROM x 4 


Neuro: CN 2-12 grossly intact, doesn't follow commands


Psych: confused, ignores instructions 





- Constitutional


Vitals: 


                                        











Temp Pulse Resp BP Pulse Ox


 


 98.6 F   89   16   118/82   98 


 


 02/26/19 04:08  02/26/19 12:41  02/26/19 12:41  02/26/19 12:41  02/26/19 12:41











General appearance: Present: no acute distress





Results





- Labs


CBC & Chem 7: 


                                 02/25/19 17:09





                                 02/25/19 17:09


Labs: 


                             Laboratory Last Values











WBC  9.0 K/mm3 (4.5-11.0)   02/25/19  17:09    


 


RBC  3.68 M/mm3 (3.65-5.03)   02/25/19  17:09    


 


Hgb  11.9 gm/dl (11.8-15.2)   02/25/19  17:09    


 


Hct  34.4 % (35.5-45.6)  L  02/25/19  17:09    


 


MCV  94 fl (84-94)   02/25/19  17:09    


 


MCH  32 pg (28-32)   02/25/19  17:09    


 


MCHC  35 % (32-34)  H  02/25/19  17:09    


 


RDW  13.5 % (13.2-15.2)   02/25/19  17:09    


 


Plt Count  407 K/mm3 (140-440)   02/25/19  17:09    


 


Lymph % (Auto)  18.6 % (13.4-35.0)   02/23/19  05:19    


 


Mono % (Auto)  14.3 % (0.0-7.3)  H  02/23/19  05:19    


 


Eos % (Auto)  0.8 % (0.0-4.3)   02/23/19  05:19    


 


Baso % (Auto)  0.5 % (0.0-1.8)   02/23/19  05:19    


 


Lymph #  1.3 K/mm3 (1.2-5.4)   02/23/19  05:19    


 


Mono #  1.0 K/mm3 (0.0-0.8)  H  02/23/19  05:19    


 


Eos #  0.1 K/mm3 (0.0-0.4)   02/23/19  05:19    


 


Baso #  0.0 K/mm3 (0.0-0.1)   02/23/19  05:19    


 


Seg Neutrophils %  65.8 % (40.0-70.0)   02/23/19  05:19    


 


Seg Neutrophils #  4.7 K/mm3 (1.8-7.7)   02/23/19  05:19    


 


PT  13.7 Sec. (12.2-14.9)   02/21/19  12:02    


 


INR  0.99  (0.87-1.13)   02/21/19  12:02    


 


APTT  27.9 Sec. (24.2-36.6)   02/21/19  12:02    


 


VBG pH  7.392  (7.320-7.420)   02/21/19  12:02    


 


Sodium  137 mmol/L (137-145)   02/25/19  17:09    


 


Potassium  3.7 mmol/L (3.6-5.0)   02/25/19  17:09    


 


Chloride  104.4 mmol/L ()   02/25/19  17:09    


 


Carbon Dioxide  17 mmol/L (22-30)  L  02/25/19  17:09    


 


Anion Gap  19 mmol/L  02/25/19  17:09    


 


BUN  34 mg/dL (9-20)  H  02/25/19  17:09    


 


Creatinine  1.2 mg/dL (0.8-1.5)   02/25/19  17:09    


 


Estimated GFR  > 60 ml/min  02/25/19  17:09    


 


BUN/Creatinine Ratio  28 %  02/25/19  17:09    


 


Glucose  132 mg/dL ()  H  02/25/19  17:09    


 


POC Glucose  134  ()  H  02/26/19  09:24    


 


Lactic Acid  1.50 mmol/L (0.7-2.0)   02/21/19  22:57    


 


Calcium  8.8 mg/dL (8.4-10.2)   02/25/19  17:09    


 


Magnesium  2.70 mg/dL (1.7-2.3)  H  02/21/19  12:02    


 


Total Bilirubin  0.50 mg/dL (0.1-1.2)   02/22/19  05:00    


 


AST  8 units/L (5-40)   02/22/19  05:00    


 


ALT  < 5 units/L (7-56)  L  02/22/19  05:00    


 


Alkaline Phosphatase  45 units/L ()   02/22/19  05:00    


 


Ammonia  78.0 umol/L (25-60)  H  02/21/19  12:02    


 


Total Creatine Kinase  18 units/L ()  L  02/21/19  12:02    


 


Troponin T  < 0.010 ng/mL (0.00-0.029)   02/21/19  12:02    


 


Total Protein  6.5 g/dL (6.3-8.2)  D 02/22/19  05:00    


 


Albumin  3.1 g/dL (3.9-5)  L  02/22/19  05:00    


 


Albumin/Globulin Ratio  0.9 %  02/22/19  05:00    


 


TSH  0.966 mlU/mL (0.270-4.200)   02/21/19  12:02    


 


Free T4  1.35 ng/dL (0.76-1.46)   02/21/19  12:02    


 


Urine Color  Korin  (Yellow)   02/21/19  12:46    


 


Urine Turbidity  Turbid  (Clear)   02/21/19  12:46    


 


Urine pH  8.0  (5.0-7.0)  H  02/21/19  12:46    


 


Ur Specific Gravity  1.012  (1.003-1.030)   02/21/19  12:46    


 


Urine Protein  >2000 mg dl mg/dL (Negative)   02/21/19  12:46    


 


Urine Glucose (UA)  Neg mg/dL (Negative)   02/21/19  12:46    


 


Urine Ketones  Neg mg/dL (Negative)   02/21/19  12:46    


 


Urine Blood  Neg  (Negative)   02/21/19  12:46    


 


Urine Nitrite  Neg  (Negative)   02/21/19  12:46    


 


Urine Bilirubin  Neg  (Negative)   02/21/19  12:46    


 


Urine Urobilinogen  < 2.0 mg/dL (<2.0)   02/21/19  12:46    


 


Ur Leukocyte Esterase  Lg  (Negative)   02/21/19  12:46    


 


Urine WBC (Auto)  150.0 /HPF (0.0-6.0)  H  02/21/19  12:46    


 


Urine RBC (Auto)  44.0 /HPF (0.0-6.0)   02/21/19  12:46    


 


U Epithel Cells (Auto)  < 1.0 /HPF (0-13.0)   02/21/19  12:46    


 


Urine Bacteria (Auto)  2+ /HPF (Negative)   02/21/19  12:46    


 


Urine WBC Clumps  2+ /HPF  02/21/19  12:46    


 


Urine Mucus  2+ /HPF  02/21/19  12:46    


 


Salicylates  < 0.3 mg/dL (2.8-20.0)  L  02/21/19  12:02    


 


Urine Opiates Screen  Presumptive negative   02/21/19  12:46    


 


Urine Methadone Screen  Presumptive negative   02/21/19  12:46    


 


Acetaminophen  < 5.0 ug/mL (10.0-30.0)  L  02/21/19  12:02    


 


Ur Barbiturates Screen  Presumptive negative   02/21/19  12:46    


 


Ur Phencyclidine Scrn  Presumptive negative   02/21/19  12:46    


 


Ur Amphetamines Screen  Presumptive negative   02/21/19  12:46    


 


U Benzodiazepines Scrn  Presumptive negative   02/21/19  12:46    


 


Urine Cocaine Screen  Presumptive negative   02/21/19  12:46    


 


U Marijuana (THC) Screen  Presumptive negative   02/21/19  12:46    


 


Drugs of Abuse Note  Disclamer   02/21/19  12:46    


 


Plasma/Serum Alcohol  < 0.01 % (0-0.07)   02/21/19  12:02    


 


HIV 1&2 Antibody Rapid  Non react  (Non React)   02/21/19  12:02    


 


HIV P24 Antigen  Non react  (Non React)   02/21/19  12:02

## 2019-02-27 NOTE — PROGRESS NOTE
Assessment and Plan


Assessment and plan: 


Patient is a 53 yo man with PMH of ETOH dependence, COPD, and malnutrition who 

presented to ED with AMS and was found to have encephalopathy, sepsis 2/2 UTI, 

and acute renal failure upon admission. There were also reports of rectal 

bleeding per family, GI evaluated. Patient on alcohol withdrawal protocol, 

restraints, no plan for endoscopy now. 





/ EtOH dependence with intoxication, now in withdrawal: treat with Ciwa, did 

poorly with PT, has h/o heavy alcohol drinking, cont Thiamine, Folic acid, 

multivitamin, CIWA protocol with librium and ativan as needed.


/Sepsis due to UTI with suspected obstruction/Urinary retention: Sepsis 

protocol, cont IV antibiotic therapy, monitor uop q shift, IVF resuscitation 

therapy, follow Cx, ordered renal u/s


/GI bleed, resolved: GI consulted in ED, cont ppi therapy, stable h/h,  supporti

ve care. No plan for endoscopy


/Hematuria with Urinary Obstruction/Retention, PVR over 900 on 2/26/19, monae 

placed, now gross hematuria: consulted Urology


/Acute toxic Encephalopathy, likely from alcohol withdrawal, CT Head no acute 

finding, cont neuro check, seizure precautions, CIWA protocol, aspiration 

precautions, treat sepsis. Consult psych


/ARF (acute renal failure) with vasomotor nephropathy: cont IVF resuscitation, 

monitor uop q shift, repeat bmp in the am, renal u/s pending


/UTI (urinary tract infection) complicated/complex: cont IV antibiotic therapy, 

supportive care, urine Cx


/Severe malnutrition most likely, poa: Dietician consult


/DVT prophylaxis: SCD to BLE while in bed





D/W sister Jax [phone] who is visiting








History


Interval history: 


Patient was seen and examined. Follow-up on current diagnosis of AMS, still 

present. Overnight uneventful. Patient is confused. Imaging, nursing note, 

chart, labs and old chart reviewed. Discussed with patient. 





Hospitalist Physical





- Physical exam


Narrative exam: 


Gen: thin frail, NAD, Awake, Alert but confused


HEENT: NCAT, EOMI, PERRL, OP Clear 


Neck: supple, no adenopathy, no thyromegaly, no JVD 


CVS/Heart: Regular tachycardia, normal S1S2, pulses present bilaterally 


Chest/Lungs: CTA B, Symmetrical chest expansion, good air entry bilaterally 


GI/Abdomen: soft, NTND, good bowel sounds, no guarding or rebound 


/Bladder: no suprapubic tenderness, no CVA or paraspinal tenderness 


Extermity/Skin: no c/c/e, no obvious rash 


MSK: FROM x 4 


Neuro: CN 2-12 grossly intact, doesn't follow commands


Psych: confused, ignores instructions 





- Constitutional


Vitals: 


                                        











Temp Pulse Resp BP Pulse Ox


 


 97.6 F   77   20   121/80   96 


 


 02/27/19 05:08  02/27/19 05:08  02/27/19 10:00  02/27/19 05:08  02/27/19 09:07











General appearance: Present: no acute distress





Results





- Labs


CBC & Chem 7: 


                                 02/25/19 17:09





                                 02/25/19 17:09


Labs: 


                             Laboratory Last Values











WBC  9.0 K/mm3 (4.5-11.0)   02/25/19  17:09    


 


RBC  3.68 M/mm3 (3.65-5.03)   02/25/19  17:09    


 


Hgb  11.9 gm/dl (11.8-15.2)   02/25/19  17:09    


 


Hct  34.4 % (35.5-45.6)  L  02/25/19  17:09    


 


MCV  94 fl (84-94)   02/25/19  17:09    


 


MCH  32 pg (28-32)   02/25/19  17:09    


 


MCHC  35 % (32-34)  H  02/25/19  17:09    


 


RDW  13.5 % (13.2-15.2)   02/25/19  17:09    


 


Plt Count  407 K/mm3 (140-440)   02/25/19  17:09    


 


Lymph % (Auto)  18.6 % (13.4-35.0)   02/23/19  05:19    


 


Mono % (Auto)  14.3 % (0.0-7.3)  H  02/23/19  05:19    


 


Eos % (Auto)  0.8 % (0.0-4.3)   02/23/19  05:19    


 


Baso % (Auto)  0.5 % (0.0-1.8)   02/23/19  05:19    


 


Lymph #  1.3 K/mm3 (1.2-5.4)   02/23/19  05:19    


 


Mono #  1.0 K/mm3 (0.0-0.8)  H  02/23/19  05:19    


 


Eos #  0.1 K/mm3 (0.0-0.4)   02/23/19  05:19    


 


Baso #  0.0 K/mm3 (0.0-0.1)   02/23/19  05:19    


 


Seg Neutrophils %  65.8 % (40.0-70.0)   02/23/19  05:19    


 


Seg Neutrophils #  4.7 K/mm3 (1.8-7.7)   02/23/19  05:19    


 


PT  13.7 Sec. (12.2-14.9)   02/21/19  12:02    


 


INR  0.99  (0.87-1.13)   02/21/19  12:02    


 


APTT  27.9 Sec. (24.2-36.6)   02/21/19  12:02    


 


VBG pH  7.392  (7.320-7.420)   02/21/19  12:02    


 


Sodium  137 mmol/L (137-145)   02/25/19  17:09    


 


Potassium  3.7 mmol/L (3.6-5.0)   02/25/19  17:09    


 


Chloride  104.4 mmol/L ()   02/25/19  17:09    


 


Carbon Dioxide  17 mmol/L (22-30)  L  02/25/19  17:09    


 


Anion Gap  19 mmol/L  02/25/19  17:09    


 


BUN  34 mg/dL (9-20)  H  02/25/19  17:09    


 


Creatinine  1.2 mg/dL (0.8-1.5)   02/25/19  17:09    


 


Estimated GFR  > 60 ml/min  02/25/19  17:09    


 


BUN/Creatinine Ratio  28 %  02/25/19  17:09    


 


Glucose  132 mg/dL ()  H  02/25/19  17:09    


 


POC Glucose  109  ()  H  02/26/19  17:13    


 


Lactic Acid  1.50 mmol/L (0.7-2.0)   02/21/19  22:57    


 


Calcium  8.8 mg/dL (8.4-10.2)   02/25/19  17:09    


 


Magnesium  2.70 mg/dL (1.7-2.3)  H  02/21/19  12:02    


 


Total Bilirubin  0.50 mg/dL (0.1-1.2)   02/22/19  05:00    


 


AST  8 units/L (5-40)   02/22/19  05:00    


 


ALT  < 5 units/L (7-56)  L  02/22/19  05:00    


 


Alkaline Phosphatase  45 units/L ()   02/22/19  05:00    


 


Ammonia  78.0 umol/L (25-60)  H  02/21/19  12:02    


 


Total Creatine Kinase  18 units/L ()  L  02/21/19  12:02    


 


Troponin T  < 0.010 ng/mL (0.00-0.029)   02/21/19  12:02    


 


Total Protein  6.5 g/dL (6.3-8.2)  D 02/22/19  05:00    


 


Albumin  3.1 g/dL (3.9-5)  L  02/22/19  05:00    


 


Albumin/Globulin Ratio  0.9 %  02/22/19  05:00    


 


TSH  0.966 mlU/mL (0.270-4.200)   02/21/19  12:02    


 


Free T4  1.35 ng/dL (0.76-1.46)   02/21/19  12:02    


 


Urine Color  Red  (Yellow)   02/26/19  Unknown


 


Urine Turbidity  Turbid  (Clear)   02/26/19  Unknown


 


Urine pH  7.0  (5.0-7.0)   02/26/19  Unknown


 


Ur Specific Gravity  1.018  (1.003-1.030)   02/26/19  Unknown


 


Urine Protein  100 mg/dl mg/dL (Negative)   02/26/19  Unknown


 


Urine Glucose (UA)  Neg mg/dL (Negative)   02/26/19  Unknown


 


Urine Ketones  Neg mg/dL (Negative)   02/26/19  Unknown


 


Urine Blood  Lg  (Negative)   02/26/19  Unknown


 


Urine Nitrite  Neg  (Negative)   02/26/19  Unknown


 


Urine Bilirubin  Neg  (Negative)   02/26/19  Unknown


 


Urine Urobilinogen  < 2.0 mg/dL (<2.0)   02/26/19  Unknown


 


Ur Leukocyte Esterase  Mod  (Negative)   02/26/19  Unknown


 


Urine WBC (Auto)  > 182.0 /HPF (0.0-6.0)  H  02/26/19  Unknown


 


Urine RBC (Auto)  > 182.0 /HPF (0.0-6.0)   02/26/19  Unknown


 


U Epithel Cells (Auto)  < 1.0 /HPF (0-13.0)   02/21/19  12:46    


 


Urine Bacteria (Auto)  2+ /HPF (Negative)   02/21/19  12:46    


 


Urine WBC Clumps  2+ /HPF  02/21/19  12:46    


 


Urine Mucus  2+ /HPF  02/21/19  12:46    


 


Salicylates  < 0.3 mg/dL (2.8-20.0)  L  02/21/19  12:02    


 


Urine Opiates Screen  Presumptive negative   02/21/19  12:46    


 


Urine Methadone Screen  Presumptive negative   02/21/19  12:46    


 


Acetaminophen  < 5.0 ug/mL (10.0-30.0)  L  02/21/19  12:02    


 


Ur Barbiturates Screen  Presumptive negative   02/21/19  12:46    


 


Ur Phencyclidine Scrn  Presumptive negative   02/21/19  12:46    


 


Ur Amphetamines Screen  Presumptive negative   02/21/19  12:46    


 


U Benzodiazepines Scrn  Presumptive negative   02/21/19  12:46    


 


Urine Cocaine Screen  Presumptive negative   02/21/19  12:46    


 


U Marijuana (THC) Screen  Presumptive negative   02/21/19  12:46    


 


Drugs of Abuse Note  Disclamer   02/21/19  12:46    


 


Plasma/Serum Alcohol  < 0.01 % (0-0.07)   02/21/19  12:02    


 


HIV 1&2 Antibody Rapid  Non react  (Non React)   02/21/19  12:02    


 


HIV P24 Antigen  Non react  (Non React)   02/21/19  12:02    














Nutrition/Malnutrition Assess





- Dietary Evaluation


Nutrition/Malnutrition Findings: 


                                        





Nutrition Notes                                            Start:  02/27/19 

13:13


Freq:                                                      Status: Active       




Protocol:                                                                       




 Document     02/27/19 13:13  RM  (Rec: 02/27/19 13:22    [id number])


 Nutrition Notes


     Need for Assessment generated from:         Low BMI


     Initial or Follow up                        Assessment


     Current Diagnosis                           Acute Kidney Injury


                                                 COPD


                                                 Sepsis


     Other Pertinent Diagnosis                   ETOH dependence, GI bleed, UTI


                                                 , encephalopathy


     Current Diet                                GI soft


     Labs/Tests                                  Reviewed


     Pertinent Medications                       Reviewed


     Height                                      5 ft 8 in


     Weight                                      52.6 kg


     Ideal Body Weight (kg)                      70.00


     BMI                                         17.6


     Subjective/Other Information                Screened for low BMI.


                                                 Pt confused and in restraints


                                                 at time of visit. Noted


                                                 temporal wasting. Per tech pt


                                                 at 50 to 75% of lunch.


     Burn                                        Absent


     Trauma                                      Absent


     #1


      Nutrition Diagnosis                        Malnutrition


      Etiology                                   encephalopathy, ETOH


                                                 dependence


      As Evidenced by Signs and Symptoms         pt BMI of 17.6, temporal


                                                 wasting


     Is patient on ventilator?                   No


     Is Patient Ambulatory and/or Out of Bed     Yes


     REE-([place] Jeor-ambulatory/OOB) [     1749.150


      [id number]]                               


     Kcal/Kg value to use for calculation        39


     Approximate Energy Requirements Using       2051


      kcal/Kg                                    


     Calculation Used for Recommendations        Kcal/kg


     Additional Notes                            Protein Needs: 63-79g (1.2-1.


                                                 5g/kg)


                                                 Fluid Needs: 1 ml/kcal


 Nutrition Intervention


     Change Diet Order:                          Continue current


     Add Supplement/Snack (indicate name/kcal    Ensure Enlive 1 daily


      /protein )                                 


     Provides kCal:                              350


     Provides Protein (gm)                       20


     Goal #1                                     Meet at least 75% of calorie


                                                 and protein needs via PO and


                                                 ONS intakes


     Goal #2                                     Wt gain/maintenance


     Anticipated Discharge Needs:                unable to determine at this


                                                 time


     Follow-Up By:                               03/01/19


     Additional Comments                         Follow for PO and ONS intakes

## 2019-02-27 NOTE — CONSULTATION
History of Present Illness





- Reason for Consult


Consult date: 02/27/19





- History of Present Illness





53 YO Male with ETOH Dependence, COPD, Malnutrition presents to ED for 

evaluation. Pt is Lethargic and unable to provide history. Pt history taken from

family who is at bedside during exam and interview. As per family, the patient 

has become increasingly confused and weak over the past 4 days with worsening 

symptoms over the past 2 days. Pt is currently unable to independently conduct 

activities of daily living. EMS notified, and upon arrival the patient was found

to be in distress and transported to [facility] for further care and evaluation. Pt 

seen and evaluated in ED and found to have Encephalopathy, Sepsis secondary to 

UTI, Acute Renal Failure, and Rectal Bleeding. Pt admitted to IMCU and initiated

on sepsis protocol. GI consulted. 





pt getting WES - prelim - normal





monae draining slightly bloody urine


monae in balloon in urethra (pt pulling on monae)





changed to 18F [place] tip with wire - irrigated pink tinge





A/P





BPH


gross hematuria


monae trauma


consider mittens


ok to remove monae in few days----do at 6am ***








Past History


Past Medical History: COPD, other (Malnutrition)


Past Surgical History: No surgical history, Other (reviewed)


Social history: alcohol abuse


Family history: no significant family history (reviewed)





Medications and Allergies


                                    Allergies











Allergy/AdvReac Type Severity Reaction Status Date / Time


 


No Known Allergies Allergy   Unverified 02/21/19 11:27











                                Home Medications











 Medication  Instructions  Recorded  Confirmed  Last Taken  Type


 


Unobtainable  02/21/19 02/21/19 Unknown History











Active Meds: 


Active Medications





Acetaminophen (Tylenol)  650 mg PO Q4H PRN


   PRN Reason: Pain MILD(1-3)/Fever >100.5/HA


   Last Admin: 02/26/19 21:50 Dose:  650 mg


   Documented by: 


Albuterol (Proventil)  2.5 mg IH Q4HRT PRN


   PRN Reason: Shortness Of Breath


Chlordiazepoxide HCl (Librium)  25 mg PO Q8H MIGUEL


   Last Admin: 02/27/19 14:11 Dose:  25 mg


   Documented by: 


Cefepime HCl (Maxipime/Ns 2 Gm/100 Ml)  2 gm in 100 mls @ 200 mls/hr IV Q8HR 

MIGUEL; Protocol


   Last Admin: 02/27/19 14:11 Dose:  200 mls/hr


   Documented by: 


Lansoprazole (Prevacid Solutab)  30 mg FEEDTUBE BID [facility]


   Last Admin: 02/27/19 10:52 Dose:  30 mg


   Documented by: 


Lorazepam (Ativan)  0.5 mg IV Q4H PRN


   PRN Reason: Agitation


   Last Admin: 02/26/19 02:21 Dose:  0.5 mg


   Documented by: 


Ondansetron HCl (Zofran)  4 mg IV Q8H PRN


   PRN Reason: Nausea And Vomiting


Sodium Chloride (Sodium Chloride Flush Syringe 10 Ml)  10 ml IV BID [facility]


   Last Admin: 02/27/19 10:56 Dose:  10 ml


   Documented by: 


Sodium Chloride (Sodium Chloride Flush Syringe 10 Ml)  10 ml IV PRN PRN


   PRN Reason: LINE FLUSH











Exam





- Constitutional


Vitals: 


                                        











Temp Pulse Resp BP Pulse Ox


 


 97.6 F   77   20   121/80   96 


 


 02/27/19 05:08  02/27/19 05:08  02/27/19 10:00  02/27/19 05:08  02/27/19 09:07














Results





- Labs


CBC & Chem 7: 


                                 02/25/19 17:09





                                 02/25/19 17:09


Labs: 


                              Abnormal lab results











  02/26/19 02/26/19 Range/Units





  17:13 Unknown 


 


POC Glucose  109 H   ()  


 


Urine WBC (Auto)   > 182.0 H  (0.0-6.0)  /HPF

## 2019-02-27 NOTE — ULTRASOUND REPORT
FINAL REPORT



EXAM:  US RENAL BILAT



HISTORY:  hematuria 



TECHNIQUE:  Grayscale and color-flow images of the kidneys and urinary bladder was performed.



Comparison: None 



FINDINGS:  

Right kidney: Measures 10 centimeters x 4.5 centimeters x 5.2 centimeters with cortical thickness harsh
surement of 1.4 centimeters.



Left kidney: Measures 10.2 centimeters x 5.4 centimeters x 4.9 centimeters with cortical thickness me
asurement of 0.8 centimeter.



There is no demonstration of hydronephrosis, renal calculi or renal mass of either kidney. 



Urinary bladder: Decompressed. There appears to be marked increased thickness of the urinary bladder 
wall (20 millimeters). A Liriano catheter is demonstrated in the urinary bladder.



Incidental note is made of demonstration of gallstones within the gallbladder. 



IMPRESSION:  

1. Appearance of marked increased thickness of the urinary bladder wall. Balloon catheter within the 
urinary bladder. 



2. Unremarkable appearance of the kidneys.



3. Gallstones within the gallbladder.

## 2019-02-28 NOTE — PROGRESS NOTE
Assessment and Plan


Assessment and plan: 


Patient is a 51 yo man with PMH of ETOH dependence, COPD, and malnutrition who 

presented to ED with AMS and was found to have encephalopathy, sepsis 2/2 UTI, 

and acute renal failure upon admission. There were also reports of rectal 

bleeding per family, GI evaluated. Patient on alcohol withdrawal protocol, 

restraints, no plan for endoscopy now. 





* Renal Ultrasound IMPRESSION: 1. Appearance of marked increased thickness of 

  the urinary bladder wall. Balloon catheter within the urinary bladder. 2. 

  Unremarkable appearance of the kidneys. 3. Gallstones within the gallbladder. 

  There is no demonstration of hydronephrosis, renal calculi or renal mass of 

  either kidney. 





/ EtOH dependence with intoxication, now in withdrawal: treat with Ciwa, did 

poorly with PT, has h/o heavy alcohol drinking, cont Thiamine, Folic acid, 

multivitamin, CIWA protocol with librium and ativan as needed.


/Sepsis due to UTI with suspected obstruction/Urinary retention: Sepsis 

protocol, cont IV antibiotic therapy, monitor uop q shift, IVF resuscitation 

therapy, follow Cx, renal u/s reviewed, no hydronephrosis


/GI bleed, resolved: GI consulted in ED, cont ppi therapy, stable h/h,  

supportive care. No plan for endoscopy


/Hematuria with Urinary Obstruction/Retention, PVR over 900 on 2/26/19, monae 

placed, now gross hematuria: Urology note reviewed, 


/Acute toxic Encephalopathy, likely from alcohol withdrawal, CT Head no acute 

finding, cont neuro check, seizure precautions, CIWA protocol, aspiration 

precautions, treat sepsis. Consult psych


/ARF (acute renal failure) with vasomotor nephropathy: cont IVF resuscitation, 

monitor uop q shift, repeat bmp in the am, renal u/s reviewed


/UTI (urinary tract infection) complicated/complex: cont IV antibiotic therapy, 

supportive care, urine Cx


/Severe malnutrition most likely, poa: Dietician consult


/DVT prophylaxis: SCD to BLE while in bed





D/W sister Jax [phone] who was visiting


Disposition: continue inpatient care, d/c once mental status improves/ETOH WD 

resolves. Continued restraints








History


Interval history: 


Patient was seen and examined. Follow-up on current diagnosis of AMS, still pres

ent. Overnight uneventful. Patient is confused. Imaging, nursing note, chart, 

labs and old chart reviewed. Discussed with patient. 





Hospitalist Physical





- Physical exam


Narrative exam: 


Gen: thin frail, NAD, Awake, Alert but confused


HEENT: NCAT, EOMI, PERRL, OP Clear 


Neck: supple, no adenopathy, no thyromegaly, no JVD 


CVS/Heart: Regular tachycardia, normal S1S2, pulses present bilaterally 


Chest/Lungs: CTA B, Symmetrical chest expansion, good air entry bilaterally 


GI/Abdomen: soft, NTND, good bowel sounds, no guarding or rebound 


/Bladder: no suprapubic tenderness, no CVA or paraspinal tenderness 


Extermity/Skin: no c/c/e, no obvious rash 


MSK: FROM x 4 


Neuro: CN 2-12 grossly intact, doesn't follow commands


Psych: confused, ignores instructions 





- Constitutional


Vitals: 


                                        











Temp Pulse Resp BP Pulse Ox


 


 98.2 F   85   16   118/78   94 


 


 02/28/19 05:26  02/28/19 05:20  02/28/19 05:20  02/28/19 05:20  02/28/19 05:20











General appearance: Present: no acute distress





Results





- Labs


CBC & Chem 7: 


                                 02/25/19 17:09





                                 02/25/19 17:09


Labs: 


                             Laboratory Last Values











WBC  9.0 K/mm3 (4.5-11.0)   02/25/19  17:09    


 


RBC  3.68 M/mm3 (3.65-5.03)   02/25/19  17:09    


 


Hgb  11.9 gm/dl (11.8-15.2)   02/25/19  17:09    


 


Hct  34.4 % (35.5-45.6)  L  02/25/19  17:09    


 


MCV  94 fl (84-94)   02/25/19  17:09    


 


MCH  32 pg (28-32)   02/25/19  17:09    


 


MCHC  35 % (32-34)  H  02/25/19  17:09    


 


RDW  13.5 % (13.2-15.2)   02/25/19  17:09    


 


Plt Count  407 K/mm3 (140-440)   02/25/19  17:09    


 


Lymph % (Auto)  18.6 % (13.4-35.0)   02/23/19  05:19    


 


Mono % (Auto)  14.3 % (0.0-7.3)  H  02/23/19  05:19    


 


Eos % (Auto)  0.8 % (0.0-4.3)   02/23/19  05:19    


 


Baso % (Auto)  0.5 % (0.0-1.8)   02/23/19  05:19    


 


Lymph #  1.3 K/mm3 (1.2-5.4)   02/23/19  05:19    


 


Mono #  1.0 K/mm3 (0.0-0.8)  H  02/23/19  05:19    


 


Eos #  0.1 K/mm3 (0.0-0.4)   02/23/19  05:19    


 


Baso #  0.0 K/mm3 (0.0-0.1)   02/23/19  05:19    


 


Seg Neutrophils %  65.8 % (40.0-70.0)   02/23/19  05:19    


 


Seg Neutrophils #  4.7 K/mm3 (1.8-7.7)   02/23/19  05:19    


 


PT  13.7 Sec. (12.2-14.9)   02/21/19  12:02    


 


INR  0.99  (0.87-1.13)   02/21/19  12:02    


 


APTT  27.9 Sec. (24.2-36.6)   02/21/19  12:02    


 


VBG pH  7.392  (7.320-7.420)   02/21/19  12:02    


 


Sodium  137 mmol/L (137-145)   02/25/19  17:09    


 


Potassium  3.7 mmol/L (3.6-5.0)   02/25/19  17:09    


 


Chloride  104.4 mmol/L ()   02/25/19  17:09    


 


Carbon Dioxide  17 mmol/L (22-30)  L  02/25/19  17:09    


 


Anion Gap  19 mmol/L  02/25/19  17:09    


 


BUN  34 mg/dL (9-20)  H  02/25/19  17:09    


 


Creatinine  1.2 mg/dL (0.8-1.5)   02/25/19  17:09    


 


Estimated GFR  > 60 ml/min  02/25/19  17:09    


 


BUN/Creatinine Ratio  28 %  02/25/19  17:09    


 


Glucose  132 mg/dL ()  H  02/25/19  17:09    


 


POC Glucose  108  ()  H  02/28/19  05:28    


 


Lactic Acid  1.50 mmol/L (0.7-2.0)   02/21/19  22:57    


 


Calcium  8.8 mg/dL (8.4-10.2)   02/25/19  17:09    


 


Magnesium  2.70 mg/dL (1.7-2.3)  H  02/21/19  12:02    


 


Total Bilirubin  0.50 mg/dL (0.1-1.2)   02/22/19  05:00    


 


AST  8 units/L (5-40)   02/22/19  05:00    


 


ALT  < 5 units/L (7-56)  L  02/22/19  05:00    


 


Alkaline Phosphatase  45 units/L ()   02/22/19  05:00    


 


Ammonia  78.0 umol/L (25-60)  H  02/21/19  12:02    


 


Total Creatine Kinase  18 units/L ()  L  02/21/19  12:02    


 


Troponin T  < 0.010 ng/mL (0.00-0.029)   02/21/19  12:02    


 


Total Protein  6.5 g/dL (6.3-8.2)  D 02/22/19  05:00    


 


Albumin  3.1 g/dL (3.9-5)  L  02/22/19  05:00    


 


Albumin/Globulin Ratio  0.9 %  02/22/19  05:00    


 


TSH  0.966 mlU/mL (0.270-4.200)   02/21/19  12:02    


 


Free T4  1.35 ng/dL (0.76-1.46)   02/21/19  12:02    


 


Urine Color  Red  (Yellow)   02/26/19  Unknown


 


Urine Turbidity  Turbid  (Clear)   02/26/19  Unknown


 


Urine pH  7.0  (5.0-7.0)   02/26/19  Unknown


 


Ur Specific Gravity  1.018  (1.003-1.030)   02/26/19  Unknown


 


Urine Protein  100 mg/dl mg/dL (Negative)   02/26/19  Unknown


 


Urine Glucose (UA)  Neg mg/dL (Negative)   02/26/19  Unknown


 


Urine Ketones  Neg mg/dL (Negative)   02/26/19  Unknown


 


Urine Blood  Lg  (Negative)   02/26/19  Unknown


 


Urine Nitrite  Neg  (Negative)   02/26/19  Unknown


 


Urine Bilirubin  Neg  (Negative)   02/26/19  Unknown


 


Urine Urobilinogen  < 2.0 mg/dL (<2.0)   02/26/19  Unknown


 


Ur Leukocyte Esterase  Mod  (Negative)   02/26/19  Unknown


 


Urine WBC (Auto)  > 182.0 /HPF (0.0-6.0)  H  02/26/19  Unknown


 


Urine RBC (Auto)  > 182.0 /HPF (0.0-6.0)   02/26/19  Unknown


 


U Epithel Cells (Auto)  < 1.0 /HPF (0-13.0)   02/21/19  12:46    


 


Urine Bacteria (Auto)  2+ /HPF (Negative)   02/21/19  12:46    


 


Urine WBC Clumps  2+ /HPF  02/21/19  12:46    


 


Urine Mucus  2+ /HPF  02/21/19  12:46    


 


Salicylates  < 0.3 mg/dL (2.8-20.0)  L  02/21/19  12:02    


 


Urine Opiates Screen  Presumptive negative   02/21/19  12:46    


 


Urine Methadone Screen  Presumptive negative   02/21/19  12:46    


 


Acetaminophen  < 5.0 ug/mL (10.0-30.0)  L  02/21/19  12:02    


 


Ur Barbiturates Screen  Presumptive negative   02/21/19  12:46    


 


Ur Phencyclidine Scrn  Presumptive negative   02/21/19  12:46    


 


Ur Amphetamines Screen  Presumptive negative   02/21/19  12:46    


 


U Benzodiazepines Scrn  Presumptive negative   02/21/19  12:46    


 


Urine Cocaine Screen  Presumptive negative   02/21/19  12:46    


 


U Marijuana (THC) Screen  Presumptive negative   02/21/19  12:46    


 


Drugs of Abuse Note  Disclamer   02/21/19  12:46    


 


Plasma/Serum Alcohol  < 0.01 % (0-0.07)   02/21/19  12:02    


 


HIV 1&2 Antibody Rapid  Non react  (Non React)   02/21/19  12:02    


 


HIV P24 Antigen  Non react  (Non React)   02/21/19  12:02    














Nutrition/Malnutrition Assess





- Dietary Evaluation


Nutrition/Malnutrition Findings: 


                                        





Nutrition Notes                                            Start:  02/27/19 

13:13


Freq:                                                      Status: Active       




Protocol:                                                                       




 Document     02/27/19 13:13  RM  (Rec: 02/27/19 13:22  RM  [id number])


 Nutrition Notes


     Need for Assessment generated from:         Low BMI


     Initial or Follow up                        Assessment


     Current Diagnosis                           Acute Kidney Injury


                                                 COPD


                                                 Sepsis


     Other Pertinent Diagnosis                   ETOH dependence, GI bleed, UTI


                                                 , encephalopathy


     Current Diet                                GI soft


     Labs/Tests                                  Reviewed


     Pertinent Medications                       Reviewed


     Height                                      5 ft 8 in


     Weight                                      52.6 kg


     Ideal Body Weight (kg)                      70.00


     BMI                                         17.6


     Subjective/Other Information                Screened for low BMI.


                                                 Pt confused and in restraints


                                                 at time of visit. Noted


                                                 temporal wasting. Per tech pt


                                                 at 50 to 75% of lunch.


     Burn                                        Absent


     Trauma                                      Absent


     #1


      Nutrition Diagnosis                        Malnutrition


      Etiology                                   encephalopathy, ETOH


                                                 dependence


      As Evidenced by Signs and Symptoms         pt BMI of 17.6, temporal


                                                 wasting


     Is patient on ventilator?                   No


     Is Patient Ambulatory and/or Out of Bed     Yes


     REE-([facility]-ambulatory/OOB) [     1749.150


      [email or web address]]                               


     Kcal/Kg value to use for calculation        39


     Approximate Energy Requirements Using       2051


      kcal/Kg                                    


     Calculation Used for Recommendations        Kcal/kg


     Additional Notes                            Protein Needs: 63-79g (1.2-1.


                                                 5g/kg)


                                                 Fluid Needs: 1 ml/kcal


 Nutrition Intervention


     Change Diet Order:                          Continue current


     Add Supplement/Snack (indicate name/kcal    Ensure Enlive 1 daily


      /protein )                                 


     Provides kCal:                              350


     Provides Protein (gm)                       20


     Goal #1                                     Meet at least 75% of calorie


                                                 and protein needs via PO and


                                                 ONS intakes


     Goal #2                                     Wt gain/maintenance


     Anticipated Discharge Needs:                unable to determine at this


                                                 time


     Follow-Up By:                               03/01/19


     Additional Comments                         Follow for PO and ONS intakes

## 2019-03-01 NOTE — PROGRESS NOTE
Assessment and Plan


Assessment and plan: 


Patient is a 53 yo man with PMH of ETOH dependence, COPD, and malnutrition who 

presented to ED with AMS and was found to have encephalopathy, sepsis 2/2 UTI, 

and acute renal failure upon admission. There were also reports of rectal 

bleeding per family, GI evaluated. Patient on alcohol withdrawal protocol, 

restraints, no plan for endoscopy now. 





* Renal Ultrasound IMPRESSION: 1. Appearance of marked increased thickness of 

  the urinary bladder wall. Balloon catheter within the urinary bladder. 2. 

  Unremarkable appearance of the kidneys. 3. Gallstones within the gallbladder. 

  There is no demonstration of hydronephrosis, renal calculi or renal mass of 

  either kidney. 





/ EtOH dependence with intoxication, now in withdrawal: treat with Ciwa, did 

poorly with PT, has h/o heavy alcohol drinking, cont Thiamine, Folic acid, 

multivitamin, CIWA protocol with librium and ativan as needed.


/Sepsis due to UTI with suspected obstruction/Urinary retention: Sepsis 

protocol, cont IV antibiotic therapy, monitor uop q shift, IVF resuscitation 

therapy, follow Cx, renal u/s reviewed, no hydronephrosis


/GI bleed, resolved: GI consulted in ED, cont ppi therapy, stable h/h,  

supportive care. No plan for endoscopy


/Hematuria with Urinary Obstruction/Retention, PVR over 900 on 2/26/19, monae 

placed, now gross hematuria: Urology note reviewed, 


/Acute toxic Encephalopathy, likely from alcohol withdrawal, CT Head no acute 

finding, cont neuro check, seizure precautions, CIWA protocol, aspiration 

precautions, treat sepsis. Consult psych


/ARF (acute renal failure) with vasomotor nephropathy: cont IVF resuscitation, 

monitor uop q shift, repeat bmp in the am, renal u/s reviewed


/UTI (urinary tract infection) complicated/complex: cont IV antibiotic therapy, 

supportive care, urine Cx


/Severe malnutrition most likely, poa: Dietician consult


/DVT prophylaxis: SCD to BLE while in bed





D/W sister Jax [phone] who was visiting


Disposition: continue inpatient care, d/c once mental status improves/ETOH WD 

resolves. Continued restraints








History


Interval history: 


Patient was seen and examined. Follow-up on current diagnosis of AMS, still pres

ent. Overnight uneventful. Patient is confused. Imaging, nursing note, chart, 

labs and old chart reviewed. Discussed with patient. 





Hospitalist Physical





- Physical exam


Narrative exam: 


Gen: thin frail, NAD, Awake, Alert but confused


HEENT: NCAT, EOMI, PERRL, OP Clear 


Neck: supple, no adenopathy, no thyromegaly, no JVD 


CVS/Heart: Regular tachycardia, normal S1S2, pulses present bilaterally 


Chest/Lungs: CTA B, Symmetrical chest expansion, good air entry bilaterally 


GI/Abdomen: soft, NTND, good bowel sounds, no guarding or rebound 


/Bladder: no suprapubic tenderness, no CVA or paraspinal tenderness 


Extermity/Skin: no c/c/e, no obvious rash 


MSK: FROM x 4 


Neuro: CN 2-12 grossly intact, doesn't follow commands


Psych: confused, ignores instructions 





- Constitutional


Vitals: 


                                        











Temp Pulse Resp BP Pulse Ox


 


 97.5 F L  80   20   105/74   98 


 


 03/01/19 16:18  03/01/19 16:18  03/01/19 16:18  03/01/19 16:18  03/01/19 16:18











General appearance: Present: no acute distress





Results





- Labs


CBC & Chem 7: 


                                 03/01/19 05:22





                                 03/01/19 05:22


Labs: 


                             Laboratory Last Values











WBC  7.9 K/mm3 (4.5-11.0)   03/01/19  05:22    


 


RBC  3.47 M/mm3 (3.65-5.03)  L  03/01/19  05:22    


 


Hgb  10.8 gm/dl (11.8-15.2)  L  03/01/19  05:22    


 


Hct  31.9 % (35.5-45.6)  L  03/01/19  05:22    


 


MCV  92 fl (84-94)   03/01/19  05:22    


 


MCH  31 pg (28-32)   03/01/19  05:22    


 


MCHC  34 % (32-34)   03/01/19  05:22    


 


RDW  13.4 % (13.2-15.2)   03/01/19  05:22    


 


Plt Count  397 K/mm3 (140-440)   03/01/19  05:22    


 


Lymph % (Auto)  18.6 % (13.4-35.0)   02/23/19  05:19    


 


Mono % (Auto)  14.3 % (0.0-7.3)  H  02/23/19  05:19    


 


Eos % (Auto)  0.8 % (0.0-4.3)   02/23/19  05:19    


 


Baso % (Auto)  0.5 % (0.0-1.8)   02/23/19  05:19    


 


Lymph #  1.3 K/mm3 (1.2-5.4)   02/23/19  05:19    


 


Mono #  1.0 K/mm3 (0.0-0.8)  H  02/23/19  05:19    


 


Eos #  0.1 K/mm3 (0.0-0.4)   02/23/19  05:19    


 


Baso #  0.0 K/mm3 (0.0-0.1)   02/23/19  05:19    


 


Seg Neutrophils %  65.8 % (40.0-70.0)   02/23/19  05:19    


 


Seg Neutrophils #  4.7 K/mm3 (1.8-7.7)   02/23/19  05:19    


 


PT  13.7 Sec. (12.2-14.9)   02/21/19  12:02    


 


INR  0.99  (0.87-1.13)   02/21/19  12:02    


 


APTT  27.9 Sec. (24.2-36.6)   02/21/19  12:02    


 


VBG pH  7.392  (7.320-7.420)   02/21/19  12:02    


 


Sodium  135 mmol/L (137-145)  L  03/01/19  05:22    


 


Potassium  3.8 mmol/L (3.6-5.0)   03/01/19  05:22    


 


Chloride  98.9 mmol/L ()   03/01/19  05:22    


 


Carbon Dioxide  25 mmol/L (22-30)  D 03/01/19  05:22    


 


Anion Gap  15 mmol/L  03/01/19  05:22    


 


BUN  13 mg/dL (9-20)   03/01/19  05:22    


 


Creatinine  0.7 mg/dL (0.8-1.5)  L  03/01/19  05:22    


 


Estimated GFR  > 60 ml/min  03/01/19  05:22    


 


BUN/Creatinine Ratio  19 %  03/01/19  05:22    


 


Glucose  107 mg/dL ()  H  03/01/19  05:22    


 


POC Glucose  110  ()  H  03/01/19  13:16    


 


Lactic Acid  1.50 mmol/L (0.7-2.0)   02/21/19  22:57    


 


Calcium  8.5 mg/dL (8.4-10.2)   03/01/19  05:22    


 


Magnesium  2.70 mg/dL (1.7-2.3)  H  02/21/19  12:02    


 


Total Bilirubin  0.50 mg/dL (0.1-1.2)   02/22/19  05:00    


 


AST  8 units/L (5-40)   02/22/19  05:00    


 


ALT  < 5 units/L (7-56)  L  02/22/19  05:00    


 


Alkaline Phosphatase  45 units/L ()   02/22/19  05:00    


 


Ammonia  78.0 umol/L (25-60)  H  02/21/19  12:02    


 


Total Creatine Kinase  18 units/L ()  L  02/21/19  12:02    


 


Troponin T  < 0.010 ng/mL (0.00-0.029)   02/21/19  12:02    


 


Total Protein  6.5 g/dL (6.3-8.2)  D 02/22/19  05:00    


 


Albumin  3.1 g/dL (3.9-5)  L  02/22/19  05:00    


 


Albumin/Globulin Ratio  0.9 %  02/22/19  05:00    


 


TSH  0.966 mlU/mL (0.270-4.200)   02/21/19  12:02    


 


Free T4  1.35 ng/dL (0.76-1.46)   02/21/19  12:02    


 


Urine Color  Red  (Yellow)   02/26/19  Unknown


 


Urine Turbidity  Turbid  (Clear)   02/26/19  Unknown


 


Urine pH  7.0  (5.0-7.0)   02/26/19  Unknown


 


Ur Specific Gravity  1.018  (1.003-1.030)   02/26/19  Unknown


 


Urine Protein  100 mg/dl mg/dL (Negative)   02/26/19  Unknown


 


Urine Glucose (UA)  Neg mg/dL (Negative)   02/26/19  Unknown


 


Urine Ketones  Neg mg/dL (Negative)   02/26/19  Unknown


 


Urine Blood  Lg  (Negative)   02/26/19  Unknown


 


Urine Nitrite  Neg  (Negative)   02/26/19  Unknown


 


Urine Bilirubin  Neg  (Negative)   02/26/19  Unknown


 


Urine Urobilinogen  < 2.0 mg/dL (<2.0)   02/26/19  Unknown


 


Ur Leukocyte Esterase  Mod  (Negative)   02/26/19  Unknown


 


Urine WBC (Auto)  > 182.0 /HPF (0.0-6.0)  H  02/26/19  Unknown


 


Urine RBC (Auto)  > 182.0 /HPF (0.0-6.0)   02/26/19  Unknown


 


U Epithel Cells (Auto)  < 1.0 /HPF (0-13.0)   02/21/19  12:46    


 


Urine Bacteria (Auto)  2+ /HPF (Negative)   02/21/19  12:46    


 


Urine WBC Clumps  2+ /HPF  02/21/19  12:46    


 


Urine Mucus  2+ /HPF  02/21/19  12:46    


 


Salicylates  < 0.3 mg/dL (2.8-20.0)  L  02/21/19  12:02    


 


Urine Opiates Screen  Presumptive negative   02/21/19  12:46    


 


Urine Methadone Screen  Presumptive negative   02/21/19  12:46    


 


Acetaminophen  < 5.0 ug/mL (10.0-30.0)  L  02/21/19  12:02    


 


Ur Barbiturates Screen  Presumptive negative   02/21/19  12:46    


 


Ur Phencyclidine Scrn  Presumptive negative   02/21/19  12:46    


 


Ur Amphetamines Screen  Presumptive negative   02/21/19  12:46    


 


U Benzodiazepines Scrn  Presumptive negative   02/21/19  12:46    


 


Urine Cocaine Screen  Presumptive negative   02/21/19  12:46    


 


U Marijuana (THC) Screen  Presumptive negative   02/21/19  12:46    


 


Drugs of Abuse Note  Disclamer   02/21/19  12:46    


 


Plasma/Serum Alcohol  < 0.01 % (0-0.07)   02/21/19  12:02    


 


HIV 1&2 Antibody Rapid  Non react  (Non React)   02/21/19  12:02    


 


HIV P24 Antigen  Non react  (Non React)   02/21/19  12:02    














Nutrition/Malnutrition Assess





- Dietary Evaluation


Nutrition/Malnutrition Findings: 


                                        





Nutrition Notes                                            Start:  02/27/19 

13:13


Freq:                                                      Status: Active       




Protocol:                                                                       




 Document     03/01/19 11:56  SA  (Rec: 03/01/19 12:22  SA  [id number])


 Co-Sign      03/01/19 11:56  LP


 Nutrition Notes


     Initial or Follow up                        Reassessment


     Current Diagnosis                           Acute Kidney Injury


                                                 COPD


                                                 Sepsis


     Other Pertinent Diagnosis                   ETOH dependence, GI bleed, UTI


                                                 , encephalopathy


     Current Diet                                GI soft


     Labs/Tests                                  Reviewed


     Pertinent Medications                       Reviewed


     Height                                      5 ft 8 in


     Weight                                      52.6 kg


     Ideal Body Weight (kg)                      70.00


     BMI                                         17.6


     Subjective/Other Information                Patient states appetite is


                                                 good. Pt reports eating 100%


                                                 of meals and drinking 100% of


                                                 ONS. Pt denies swallowing/


                                                 chewing difficulty. Patient


                                                 states UBW being 125-135#.


                                                 Patient denies N/V/D.


     Percent of energy/protein needs met:        100%/100%


     Burn                                        Absent


     Trauma                                      Absent


     #1


      Nutrition Diagnosis                        Malnutrition


      Diagnosis Progress(for reassessment        Continues


       documentation)                            


     Is patient on ventilator?                   No


     Is Patient Ambulatory and/or Out of Bed     Yes


     REE-([place]-ambulatory/OOB) [     1749.150


      [email or web address]]                               


     Kcal/Kg value to use for calculation        39


     Approximate Energy Requirements Using       2051


      kcal/Kg                                    


     Calculation Used for Recommendations        Kcal/kg


     Additional Notes                            Protein Needs: 63-79g (1.2-1.


                                                 5g/kg)


                                                 Fluid Needs: 1 ml/kcal


 Nutrition Intervention


     Change Diet Order:                          Continue current


     Add Supplement/Snack (indicate name/kcal    Ensure Enlive 1 daily


      /protein )                                 


     Provides kCal:                              350


     Provides Protein (gm)                       20


     Goal #1                                     Meet at least 75% of calorie


                                                 and protein needs via PO and


                                                 ONS intakes


     Goal #2                                     Wt gain/maintenance


     Anticipated Discharge Needs:                unable to determine at this


                                                 time


     Follow-Up By:                               03/05/19


     Additional Comments                         F/U: PO and ONS intakes

## 2019-03-02 NOTE — PROGRESS NOTE
Assessment and Plan


Assessment and plan: 


Patient is a 51 yo man with PMH of ETOH dependence, COPD, and malnutrition who 

presented to ED with AMS and was found to have encephalopathy, sepsis 2/2 UTI, 

and acute renal failure upon admission. There were also reports of rectal 

bleeding per family, GI evaluated. Patient on alcohol withdrawal protocol, 

restraints, no plan for endoscopy now. 





* Renal Ultrasound IMPRESSION: 1. Appearance of marked increased thickness of 

  the urinary bladder wall. Balloon catheter within the urinary bladder. 2. 

  Unremarkable appearance of the kidneys. 3. Gallstones within the gallbladder. 

  There is no demonstration of hydronephrosis, renal calculi or renal mass of 

  either kidney. 





/ EtOH dependence with intoxication, now in withdrawal: treat with [facility], did 

poorly with PT, has h/o heavy alcohol drinking, cont Thiamine, Folic acid, 

multivitamin, CIWA protocol with librium and ativan as needed.


/Sepsis due to UTI with suspected obstruction/Urinary retention: Sepsis 

protocol, cont IV antibiotic therapy, monitor uop q shift, IVF resuscitation 

therapy, follow Cx, renal u/s reviewed, no hydronephrosis


/GI bleed, resolved: GI consulted in ED, cont ppi therapy, stable h/h,  

supportive care. No plan for endoscopy


/Hematuria with Urinary Obstruction/Retention, PVR over 900 on 2/26/19, monae 

placed, now gross hematuria: Urology note reviewed, 


/Acute toxic Encephalopathy, likely from alcohol withdrawal, CT Head no acute 

finding, cont neuro check, seizure precautions, CIWA protocol, aspiration 

precautions, treat sepsis. Consult psych


/ARF (acute renal failure) with vasomotor nephropathy: cont IVF resuscitation, 

monitor uop q shift, repeat bmp in the am, renal u/s reviewed


/UTI (urinary tract infection) complicated/complex: cont IV antibiotic therapy, 

supportive care, urine Cx


/Severe malnutrition most likely, poa: Dietician consult


/DVT prophylaxis: SCD to BLE while in bed





D/W sister Jax [phone] who was visiting


Disposition: continue inpatient care, d/c once mental status improves/ETOH WD 

resolves. Continued restraints








History


Interval history: 


Patient was seen and examined. Follow-up on current diagnosis of AMS, still pres

ent. Overnight uneventful. Patient is confused. Imaging, nursing note, chart, 

labs and old chart reviewed. Discussed with patient. 





Hospitalist Physical





- Physical exam


Narrative exam: 


Gen: thin frail, NAD, Awake, Alert but confused


HEENT: NCAT, EOMI, PERRL, OP Clear 


Neck: supple, no adenopathy, no thyromegaly, no JVD 


CVS/Heart: Regular tachycardia, normal S1S2, pulses present bilaterally 


Chest/Lungs: CTA B, Symmetrical chest expansion, good air entry bilaterally 


GI/Abdomen: soft, NTND, good bowel sounds, no guarding or rebound 


/Bladder: no suprapubic tenderness, no CVA or paraspinal tenderness 


Extermity/Skin: no c/c/e, no obvious rash 


MSK: FROM x 4 


Neuro: CN 2-12 grossly intact, doesn't follow commands


Psych: confused, ignores instructions 





- Constitutional


Vitals: 


                                        











Temp Pulse Resp BP Pulse Ox


 


 97.8 F   85   18   125/89   92 


 


 03/02/19 12:07  03/02/19 12:07  03/02/19 12:07  03/02/19 12:07  03/02/19 12:07











General appearance: Present: no acute distress





Results





- Labs


CBC & Chem 7: 


                                 03/01/19 05:22





                                 03/01/19 05:22


Labs: 


                             Laboratory Last Values











WBC  7.9 K/mm3 (4.5-11.0)   03/01/19  05:22    


 


RBC  3.47 M/mm3 (3.65-5.03)  L  03/01/19  05:22    


 


Hgb  10.8 gm/dl (11.8-15.2)  L  03/01/19  05:22    


 


Hct  31.9 % (35.5-45.6)  L  03/01/19  05:22    


 


MCV  92 fl (84-94)   03/01/19  05:22    


 


MCH  31 pg (28-32)   03/01/19  05:22    


 


MCHC  34 % (32-34)   03/01/19  05:22    


 


RDW  13.4 % (13.2-15.2)   03/01/19  05:22    


 


Plt Count  397 K/mm3 (140-440)   03/01/19  05:22    


 


Lymph % (Auto)  18.6 % (13.4-35.0)   02/23/19  05:19    


 


Mono % (Auto)  14.3 % (0.0-7.3)  H  02/23/19  05:19    


 


Eos % (Auto)  0.8 % (0.0-4.3)   02/23/19  05:19    


 


Baso % (Auto)  0.5 % (0.0-1.8)   02/23/19  05:19    


 


Lymph #  1.3 K/mm3 (1.2-5.4)   02/23/19  05:19    


 


Mono #  1.0 K/mm3 (0.0-0.8)  H  02/23/19  05:19    


 


Eos #  0.1 K/mm3 (0.0-0.4)   02/23/19  05:19    


 


Baso #  0.0 K/mm3 (0.0-0.1)   02/23/19  05:19    


 


Seg Neutrophils %  65.8 % (40.0-70.0)   02/23/19  05:19    


 


Seg Neutrophils #  4.7 K/mm3 (1.8-7.7)   02/23/19  05:19    


 


PT  13.7 Sec. (12.2-14.9)   02/21/19  12:02    


 


INR  0.99  (0.87-1.13)   02/21/19  12:02    


 


APTT  27.9 Sec. (24.2-36.6)   02/21/19  12:02    


 


VBG pH  7.392  (7.320-7.420)   02/21/19  12:02    


 


Sodium  135 mmol/L (137-145)  L  03/01/19  05:22    


 


Potassium  3.8 mmol/L (3.6-5.0)   03/01/19  05:22    


 


Chloride  98.9 mmol/L ()   03/01/19  05:22    


 


Carbon Dioxide  25 mmol/L (22-30)  D 03/01/19  05:22    


 


Anion Gap  15 mmol/L  03/01/19  05:22    


 


BUN  13 mg/dL (9-20)   03/01/19  05:22    


 


Creatinine  0.7 mg/dL (0.8-1.5)  L  03/01/19  05:22    


 


Estimated GFR  > 60 ml/min  03/01/19  05:22    


 


BUN/Creatinine Ratio  19 %  03/01/19  05:22    


 


Glucose  107 mg/dL ()  H  03/01/19  05:22    


 


POC Glucose  110  ()  H  03/01/19  13:16    


 


Lactic Acid  1.50 mmol/L (0.7-2.0)   02/21/19  22:57    


 


Calcium  8.5 mg/dL (8.4-10.2)   03/01/19  05:22    


 


Magnesium  2.70 mg/dL (1.7-2.3)  H  02/21/19  12:02    


 


Total Bilirubin  0.50 mg/dL (0.1-1.2)   02/22/19  05:00    


 


AST  8 units/L (5-40)   02/22/19  05:00    


 


ALT  < 5 units/L (7-56)  L  02/22/19  05:00    


 


Alkaline Phosphatase  45 units/L ()   02/22/19  05:00    


 


Ammonia  78.0 umol/L (25-60)  H  02/21/19  12:02    


 


Total Creatine Kinase  18 units/L ()  L  02/21/19  12:02    


 


Troponin T  < 0.010 ng/mL (0.00-0.029)   02/21/19  12:02    


 


Total Protein  6.5 g/dL (6.3-8.2)  D 02/22/19  05:00    


 


Albumin  3.1 g/dL (3.9-5)  L  02/22/19  05:00    


 


Albumin/Globulin Ratio  0.9 %  02/22/19  05:00    


 


TSH  0.966 mlU/mL (0.270-4.200)   02/21/19  12:02    


 


Free T4  1.35 ng/dL (0.76-1.46)   02/21/19  12:02    


 


Urine Color  Red  (Yellow)   02/26/19  Unknown


 


Urine Turbidity  Turbid  (Clear)   02/26/19  Unknown


 


Urine pH  7.0  (5.0-7.0)   02/26/19  Unknown


 


Ur Specific Gravity  1.018  (1.003-1.030)   02/26/19  Unknown


 


Urine Protein  100 mg/dl mg/dL (Negative)   02/26/19  Unknown


 


Urine Glucose (UA)  Neg mg/dL (Negative)   02/26/19  Unknown


 


Urine Ketones  Neg mg/dL (Negative)   02/26/19  Unknown


 


Urine Blood  Lg  (Negative)   02/26/19  Unknown


 


Urine Nitrite  Neg  (Negative)   02/26/19  Unknown


 


Urine Bilirubin  Neg  (Negative)   02/26/19  Unknown


 


Urine Urobilinogen  < 2.0 mg/dL (<2.0)   02/26/19  Unknown


 


Ur Leukocyte Esterase  Mod  (Negative)   02/26/19  Unknown


 


Urine WBC (Auto)  > 182.0 /HPF (0.0-6.0)  H  02/26/19  Unknown


 


Urine RBC (Auto)  > 182.0 /HPF (0.0-6.0)   02/26/19  Unknown


 


U Epithel Cells (Auto)  < 1.0 /HPF (0-13.0)   02/21/19  12:46    


 


Urine Bacteria (Auto)  2+ /HPF (Negative)   02/21/19  12:46    


 


Urine WBC Clumps  2+ /HPF  02/21/19  12:46    


 


Urine Mucus  2+ /HPF  02/21/19  12:46    


 


Salicylates  < 0.3 mg/dL (2.8-20.0)  L  02/21/19  12:02    


 


Urine Opiates Screen  Presumptive negative   02/21/19  12:46    


 


Urine Methadone Screen  Presumptive negative   02/21/19  12:46    


 


Acetaminophen  < 5.0 ug/mL (10.0-30.0)  L  02/21/19  12:02    


 


Ur Barbiturates Screen  Presumptive negative   02/21/19  12:46    


 


Ur Phencyclidine Scrn  Presumptive negative   02/21/19  12:46    


 


Ur Amphetamines Screen  Presumptive negative   02/21/19  12:46    


 


U Benzodiazepines Scrn  Presumptive negative   02/21/19  12:46    


 


Urine Cocaine Screen  Presumptive negative   02/21/19  12:46    


 


U Marijuana (THC) Screen  Presumptive negative   02/21/19  12:46    


 


Drugs of Abuse Note  Disclamer   02/21/19  12:46    


 


Plasma/Serum Alcohol  < 0.01 % (0-0.07)   02/21/19  12:02    


 


HIV 1&2 Antibody Rapid  Non react  (Non React)   02/21/19  12:02    


 


HIV P24 Antigen  Non react  (Non React)   02/21/19  12:02    














Nutrition/Malnutrition Assess





- Dietary Evaluation


Nutrition/Malnutrition Findings: 


                                        





Nutrition Notes                                            Start:  02/27/19 

13:13


Freq:                                                      Status: Active       




Protocol:                                                                       




 Document     03/01/19 11:56  SA  (Rec: 03/01/19 12:22  SA  [id number])


 Co-Sign      03/01/19 11:56  LP


 Nutrition Notes


     Initial or Follow up                        Reassessment


     Current Diagnosis                           Acute Kidney Injury


                                                 COPD


                                                 Sepsis


     Other Pertinent Diagnosis                   ETOH dependence, GI bleed, UTI


                                                 , encephalopathy


     Current Diet                                GI soft


     Labs/Tests                                  Reviewed


     Pertinent Medications                       Reviewed


     Height                                      5 ft 8 in


     Weight                                      52.6 kg


     Ideal Body Weight (kg)                      70.00


     BMI                                         17.6


     Subjective/Other Information                Patient states appetite is


                                                 good. Pt reports eating 100%


                                                 of meals and drinking 100% of


                                                 ONS. Pt denies swallowing/


                                                 chewing difficulty. Patient


                                                 states UBW being 125-135#.


                                                 Patient denies N/V/D.


     Percent of energy/protein needs met:        100%/100%


     Burn                                        Absent


     Trauma                                      Absent


     #1


      Nutrition Diagnosis                        Malnutrition


      Diagnosis Progress(for reassessment        Continues


       documentation)                            


     Is patient on ventilator?                   No


     Is Patient Ambulatory and/or Out of Bed     Yes


     REE-(Moffat-St. Jeor-ambulatory/OOB) [     1749.150


      [email or web address]]                               


     Kcal/Kg value to use for calculation        39


     Approximate Energy Requirements Using       2051


      kcal/Kg                                    


     Calculation Used for Recommendations        Kcal/kg


     Additional Notes                            Protein Needs: 63-79g (1.2-1.


                                                 5g/kg)


                                                 Fluid Needs: 1 ml/kcal


 Nutrition Intervention


     Change Diet Order:                          Continue current


     Add Supplement/Snack (indicate name/kcal    Ensure Enlive 1 daily


      /protein )                                 


     Provides kCal:                              350


     Provides Protein (gm)                       20


     Goal #1                                     Meet at least 75% of calorie


                                                 and protein needs via PO and


                                                 ONS intakes


     Goal #2                                     Wt gain/maintenance


     Anticipated Discharge Needs:                unable to determine at this


                                                 time


     Follow-Up By:                               03/05/19


     Additional Comments                         F/U: PO and ONS intakes

## 2019-03-03 NOTE — PROGRESS NOTE
Subjective


Date of service: 03/03/19


Principal diagnosis: hematochezia


Interval history: 


see the dictated note  patient has severe delirium and suspect Wernicke 

Korsakoff syndrome  first CT of head not at all remarkable





Thanks  will follow up








Objective





- Vital Sign


                               Vital Signs - 12hr











  03/02/19





  23:07


 


Respiratory 18





Rate 














- Laboratory Findings


CBC and BMP: 


                                 03/01/19 05:22





                                 03/01/19 05:22


Abnormal Lab Findings: 


                                  Abnormal Labs











  02/21/19 02/21/19 02/21/19





  12:02 12:02 12:02


 


WBC  12.2 H  


 


RBC   


 


Hgb   


 


Hct   


 


MCHC   


 


Lymph % (Auto)  7.5 L  


 


Mono % (Auto)  10.3 H  


 


Lymph #  0.9 L  


 


Mono #  1.2 H  


 


Seg Neutrophils %  82.0 H  


 


Seg Neutrophils #  10.0 H  


 


Sodium   135 L 


 


Chloride   93.2 L 


 


Carbon Dioxide   21 L 


 


BUN   55 H 


 


Creatinine   1.6 H 


 


Glucose   157 H 


 


POC Glucose   


 


Lactic Acid    3.60 H*


 


Magnesium   


 


ALT   < 5 L 


 


Ammonia   


 


Total Creatine Kinase   18 L 


 


Total Protein   8.4 H 


 


Albumin   


 


Urine pH   


 


Urine WBC (Auto)   


 


Salicylates   


 


Acetaminophen   














  02/21/19 02/21/19 02/21/19





  12:02 12:02 12:02


 


WBC   


 


RBC   


 


Hgb   


 


Hct   


 


MCHC   


 


Lymph % (Auto)   


 


Mono % (Auto)   


 


Lymph #   


 


Mono #   


 


Seg Neutrophils %   


 


Seg Neutrophils #   


 


Sodium   


 


Chloride   


 


Carbon Dioxide   


 


BUN   


 


Creatinine   


 


Glucose   


 


POC Glucose   


 


Lactic Acid   


 


Magnesium   


 


ALT   


 


Ammonia  78.0 H  


 


Total Creatine Kinase   


 


Total Protein   


 


Albumin   


 


Urine pH   


 


Urine WBC (Auto)   


 


Salicylates   < 0.3 L 


 


Acetaminophen    < 5.0 L














  02/21/19 02/21/19 02/21/19





  12:02 12:46 16:14


 


WBC   


 


RBC   


 


Hgb   


 


Hct   


 


MCHC   


 


Lymph % (Auto)   


 


Mono % (Auto)   


 


Lymph #   


 


Mono #   


 


Seg Neutrophils %   


 


Seg Neutrophils #   


 


Sodium   


 


Chloride   


 


Carbon Dioxide   


 


BUN   


 


Creatinine   


 


Glucose   


 


POC Glucose   


 


Lactic Acid    2.70 H*


 


Magnesium  2.70 H  


 


ALT   


 


Ammonia   


 


Total Creatine Kinase   


 


Total Protein   


 


Albumin   


 


Urine pH   8.0 H 


 


Urine WBC (Auto)   150.0 H 


 


Salicylates   


 


Acetaminophen   














  02/21/19 02/22/19 02/22/19





  23:04 05:00 05:00


 


WBC   


 


RBC   3.38 L 


 


Hgb   10.7 L 


 


Hct   33.4 L 


 


MCHC   


 


Lymph % (Auto)   


 


Mono % (Auto)   11.8 H 


 


Lymph #   


 


Mono #   1.1 H 


 


Seg Neutrophils %   73.9 H 


 


Seg Neutrophils #   


 


Sodium    136 L


 


Chloride   


 


Carbon Dioxide    19 L


 


BUN    52 H


 


Creatinine   


 


Glucose    126 H


 


POC Glucose  108 H  


 


Lactic Acid   


 


Magnesium   


 


ALT    < 5 L


 


Ammonia   


 


Total Creatine Kinase   


 


Total Protein   


 


Albumin    3.1 L


 


Urine pH   


 


Urine WBC (Auto)   


 


Salicylates   


 


Acetaminophen   














  02/22/19 02/22/19 02/23/19





  16:06 21:58 05:19


 


WBC   


 


RBC    3.31 L


 


Hgb    10.5 L


 


Hct    30.8 L


 


MCHC   


 


Lymph % (Auto)   


 


Mono % (Auto)    14.3 H


 


Lymph #   


 


[place] #    1.0 H


 


Seg Neutrophils %   


 


Seg Neutrophils #   


 


Sodium   


 


Chloride   


 


Carbon Dioxide   


 


BUN   


 


Creatinine   


 


Glucose   


 


POC Glucose  124 H  112 H 


 


Lactic Acid   


 


Magnesium   


 


ALT   


 


Ammonia   


 


Total Creatine Kinase   


 


Total Protein   


 


Albumin   


 


Urine pH   


 


Urine WBC (Auto)   


 


Salicylates   


 


Acetaminophen   














  02/23/19 02/24/19 02/24/19





  05:19 10:33 21:11


 


WBC   


 


RBC   


 


Hgb   


 


Hct   


 


MCHC   


 


Lymph % (Auto)   


 


Mono % (Auto)   


 


Lymph #   


 


Mono #   


 


Seg Neutrophils %   


 


Seg Neutrophils #   


 


Sodium   


 


Chloride   


 


Carbon Dioxide  18 L  


 


BUN  50 H  


 


Creatinine   


 


Glucose  118 H  


 


POC Glucose   128 H  128 H


 


Lactic Acid   


 


Magnesium   


 


ALT   


 


Ammonia   


 


Total Creatine Kinase   


 


Total Protein   


 


Albumin   


 


Urine pH   


 


Urine WBC (Auto)   


 


Salicylates   


 


Acetaminophen   














  02/25/19 02/25/19 02/26/19





  17:09 17:09 02:13


 


WBC   


 


RBC   


 


Hgb   


 


Hct  34.4 L  


 


MCHC  35 H  


 


Lymph % (Auto)   


 


Mono % (Auto)   


 


Lymph #   


 


Mono #   


 


Seg Neutrophils %   


 


Seg Neutrophils #   


 


Sodium   


 


Chloride   


 


Carbon Dioxide   17 L 


 


BUN   34 H 


 


Creatinine   


 


Glucose   132 H 


 


POC Glucose    144 H


 


Lactic Acid   


 


Magnesium   


 


ALT   


 


Ammonia   


 


Total Creatine Kinase   


 


Total Protein   


 


Albumin   


 


Urine pH   


 


Urine WBC (Auto)   


 


Salicylates   


 


Acetaminophen   














  02/26/19 02/26/19 02/26/19





  09:24 17:13 Unknown


 


WBC   


 


RBC   


 


Hgb   


 


Hct   


 


MCHC   


 


Lymph % (Auto)   


 


Mono % (Auto)   


 


Lymph #   


 


Mono #   


 


Seg Neutrophils %   


 


Seg Neutrophils #   


 


Sodium   


 


Chloride   


 


Carbon Dioxide   


 


BUN   


 


Creatinine   


 


Glucose   


 


POC Glucose  134 H  109 H 


 


Lactic Acid   


 


Magnesium   


 


ALT   


 


Ammonia   


 


Total Creatine Kinase   


 


Total Protein   


 


Albumin   


 


Urine pH   


 


Urine WBC (Auto)    > 182.0 H


 


Salicylates   


 


Acetaminophen   














  02/28/19 03/01/19 03/01/19





  05:28 05:22 05:22


 


WBC   


 


RBC   3.47 L 


 


Hgb   10.8 L 


 


Hct   31.9 L 


 


MCHC   


 


Lymph % (Auto)   


 


Mono % (Auto)   


 


Lymph #   


 


Mono #   


 


Seg Neutrophils %   


 


Seg Neutrophils #   


 


Sodium    135 L


 


Chloride   


 


Carbon Dioxide   


 


BUN   


 


Creatinine    0.7 L


 


Glucose    107 H


 


POC Glucose  108 H  


 


Lactic Acid   


 


Magnesium   


 


ALT   


 


Ammonia   


 


Total Creatine Kinase   


 


Total Protein   


 


Albumin   


 


Urine pH   


 


Urine WBC (Auto)   


 


Salicylates   


 


Acetaminophen   














  03/01/19





  13:16


 


WBC 


 


RBC 


 


Hgb 


 


Hct 


 


MCHC 


 


Lymph % (Auto) 


 


Mono % (Auto) 


 


Lymph # 


 


Mono # 


 


Seg Neutrophils % 


 


Seg Neutrophils # 


 


Sodium 


 


Chloride 


 


Carbon Dioxide 


 


BUN 


 


Creatinine 


 


Glucose 


 


POC Glucose  110 H


 


Lactic Acid 


 


Magnesium 


 


ALT 


 


Ammonia 


 


Total Creatine Kinase 


 


Total Protein 


 


Albumin 


 


Urine pH 


 


Urine WBC (Auto) 


 


Salicylates 


 


Acetaminophen

## 2019-03-03 NOTE — PROGRESS NOTE
Assessment and Plan


Assessment and plan: 


Patient is a 53 yo man with PMH of ETOH dependence, COPD, and malnutrition who 

presented to ED with AMS and was found to have encephalopathy, sepsis 2/2 UTI, 

and acute renal failure upon admission. There were also reports of rectal 

bleeding per family, GI evaluated. Patient on alcohol withdrawal protocol, 

restraints, no plan for endoscopy now. 





* Renal Ultrasound IMPRESSION: 1. Appearance of marked increased thickness of 

  the urinary bladder wall. Balloon catheter within the urinary bladder. 2. 

  Unremarkable appearance of the kidneys. 3. Gallstones within the gallbladder. 

  There is no demonstration of hydronephrosis, renal calculi or renal mass of 

  either kidney. 





/ EtOH dependence with intoxication, now in withdrawal: treat with [facility], did 

poorly with PT, has h/o heavy alcohol drinking, cont Thiamine, Folic acid, 

multivitamin, CIWA protocol with librium and ativan as needed.


/Sepsis due to UTI with suspected obstruction/Urinary retention: Sepsis 

protocol, cont IV antibiotic therapy, monitor uop q shift, IVF resuscitation 

therapy, follow Cx, renal u/s reviewed, no hydronephrosis


/GI bleed, resolved: GI consulted in ED, cont ppi therapy, stable h/h,  

supportive care. No plan for endoscopy


/Hematuria with Urinary Obstruction/Retention, PVR over 900 on 2/26/19, monae 

placed, now gross hematuria: Urology note reviewed, 


/Acute toxic Encephalopathy, likely from alcohol withdrawal, CT Head no acute 

finding, cont neuro check, seizure precautions, CIWA protocol, aspiration 

precautions, treat sepsis. Consult psych


/ARF (acute renal failure) with vasomotor nephropathy: cont IVF resuscitation, 

monitor uop q shift, repeat bmp in the am, renal u/s reviewed


/UTI (urinary tract infection) complicated/complex: cont IV antibiotic therapy, 

supportive care, urine Cx


/Severe malnutrition most likely, poa: Dietician consult


/DVT prophylaxis: SCD to BLE while in bed





During hospitalization I spoke with sister Jax [phone] who was visiting


Disposition: continue inpatient care, d/c once mental status improves/ETOH WD 

resolves. Continued restraints





Patient still confused, CIWA score low, will consult Neurology, Dr. Michael,

I did called, await call back. 








History


Interval history: 


Patient was seen and examined. Follow-up on current diagnosis of AMS, still 

present. Overnight uneventful. Patient is confused. Imaging, nursing note, 

chart, labs and old chart reviewed. Discussed with patient. 





Hospitalist Physical





- Physical exam


Narrative exam: 


Gen: thin frail, NAD, Awake, Alert but confused


HEENT: NCAT, EOMI, PERRL, OP Clear 


Neck: supple, no adenopathy, no thyromegaly, no JVD 


CVS/Heart: Regular tachycardia, normal S1S2, pulses present bilaterally 


Chest/Lungs: CTA B, Symmetrical chest expansion, good air entry bilaterally 


GI/Abdomen: soft, NTND, good bowel sounds, no guarding or rebound 


/Bladder: no suprapubic tenderness, no CVA or paraspinal tenderness 


Extermity/Skin: no c/c/e, no obvious rash 


MSK: FROM x 4 


Neuro: CN 2-12 grossly intact, doesn't follow commands


Psych: confused, ignores instructions 





- Constitutional


Vitals: 


                                        











Temp Pulse Resp BP Pulse Ox


 


 100.3 F H  80   18   122/78   98 


 


 03/02/19 17:00  03/02/19 19:52  03/02/19 23:07  03/02/19 17:00  03/02/19 17:00











General appearance: Present: no acute distress





Results





- Labs


CBC & Chem 7: 


                                 03/01/19 05:22





                                 03/01/19 05:22


Labs: 


                             Laboratory Last Values











WBC  7.9 K/mm3 (4.5-11.0)   03/01/19  05:22    


 


RBC  3.47 M/mm3 (3.65-5.03)  L  03/01/19  05:22    


 


Hgb  10.8 gm/dl (11.8-15.2)  L  03/01/19  05:22    


 


Hct  31.9 % (35.5-45.6)  L  03/01/19  05:22    


 


MCV  92 fl (84-94)   03/01/19  05:22    


 


MCH  31 pg (28-32)   03/01/19  05:22    


 


MCHC  34 % (32-34)   03/01/19  05:22    


 


RDW  13.4 % (13.2-15.2)   03/01/19  05:22    


 


Plt Count  397 K/mm3 (140-440)   03/01/19  05:22    


 


Lymph % (Auto)  18.6 % (13.4-35.0)   02/23/19  05:19    


 


Mono % (Auto)  14.3 % (0.0-7.3)  H  02/23/19  05:19    


 


Eos % (Auto)  0.8 % (0.0-4.3)   02/23/19  05:19    


 


Baso % (Auto)  0.5 % (0.0-1.8)   02/23/19  05:19    


 


Lymph #  1.3 K/mm3 (1.2-5.4)   02/23/19  05:19    


 


Mono #  1.0 K/mm3 (0.0-0.8)  H  02/23/19  05:19    


 


Eos #  0.1 K/mm3 (0.0-0.4)   02/23/19  05:19    


 


Baso #  0.0 K/mm3 (0.0-0.1)   02/23/19  05:19    


 


Seg Neutrophils %  65.8 % (40.0-70.0)   02/23/19  05:19    


 


Seg Neutrophils #  4.7 K/mm3 (1.8-7.7)   02/23/19  05:19    


 


PT  13.7 Sec. (12.2-14.9)   02/21/19  12:02    


 


INR  0.99  (0.87-1.13)   02/21/19  12:02    


 


APTT  27.9 Sec. (24.2-36.6)   02/21/19  12:02    


 


VBG pH  7.392  (7.320-7.420)   02/21/19  12:02    


 


Sodium  135 mmol/L (137-145)  L  03/01/19  05:22    


 


Potassium  3.8 mmol/L (3.6-5.0)   03/01/19  05:22    


 


Chloride  98.9 mmol/L ()   03/01/19  05:22    


 


Carbon Dioxide  25 mmol/L (22-30)  D 03/01/19  05:22    


 


Anion Gap  15 mmol/L  03/01/19  05:22    


 


BUN  13 mg/dL (9-20)   03/01/19  05:22    


 


Creatinine  0.7 mg/dL (0.8-1.5)  L  03/01/19  05:22    


 


Estimated GFR  > 60 ml/min  03/01/19  05:22    


 


BUN/Creatinine Ratio  19 %  03/01/19  05:22    


 


Glucose  107 mg/dL ()  H  03/01/19  05:22    


 


POC Glucose  110  ()  H  03/01/19  13:16    


 


Lactic Acid  1.50 mmol/L (0.7-2.0)   02/21/19  22:57    


 


Calcium  8.5 mg/dL (8.4-10.2)   03/01/19  05:22    


 


Magnesium  2.70 mg/dL (1.7-2.3)  H  02/21/19  12:02    


 


Total Bilirubin  0.50 mg/dL (0.1-1.2)   02/22/19  05:00    


 


AST  8 units/L (5-40)   02/22/19  05:00    


 


ALT  < 5 units/L (7-56)  L  02/22/19  05:00    


 


Alkaline Phosphatase  45 units/L ()   02/22/19  05:00    


 


Ammonia  78.0 umol/L (25-60)  H  02/21/19  12:02    


 


Total Creatine Kinase  18 units/L ()  L  02/21/19  12:02    


 


Troponin T  < 0.010 ng/mL (0.00-0.029)   02/21/19  12:02    


 


Total Protein  6.5 g/dL (6.3-8.2)  D 02/22/19  05:00    


 


Albumin  3.1 g/dL (3.9-5)  L  02/22/19  05:00    


 


Albumin/Globulin Ratio  0.9 %  02/22/19  05:00    


 


TSH  0.966 mlU/mL (0.270-4.200)   02/21/19  12:02    


 


Free T4  1.35 ng/dL (0.76-1.46)   02/21/19  12:02    


 


Urine Color  Red  (Yellow)   02/26/19  Unknown


 


Urine Turbidity  Turbid  (Clear)   02/26/19  Unknown


 


Urine pH  7.0  (5.0-7.0)   02/26/19  Unknown


 


Ur Specific Gravity  1.018  (1.003-1.030)   02/26/19  Unknown


 


Urine Protein  100 mg/dl mg/dL (Negative)   02/26/19  Unknown


 


Urine Glucose (UA)  Neg mg/dL (Negative)   02/26/19  Unknown


 


Urine Ketones  Neg mg/dL (Negative)   02/26/19  Unknown


 


Urine Blood  Lg  (Negative)   02/26/19  Unknown


 


Urine Nitrite  Neg  (Negative)   02/26/19  Unknown


 


Urine Bilirubin  Neg  (Negative)   02/26/19  Unknown


 


Urine Urobilinogen  < 2.0 mg/dL (<2.0)   02/26/19  Unknown


 


Ur Leukocyte Esterase  Mod  (Negative)   02/26/19  Unknown


 


Urine WBC (Auto)  > 182.0 /HPF (0.0-6.0)  H  02/26/19  Unknown


 


Urine RBC (Auto)  > 182.0 /HPF (0.0-6.0)   02/26/19  Unknown


 


U Epithel Cells (Auto)  < 1.0 /HPF (0-13.0)   02/21/19  12:46    


 


Urine Bacteria (Auto)  2+ /HPF (Negative)   02/21/19  12:46    


 


Urine WBC Clumps  2+ /HPF  02/21/19  12:46    


 


Urine Mucus  2+ /HPF  02/21/19  12:46    


 


Salicylates  < 0.3 mg/dL (2.8-20.0)  L  02/21/19  12:02    


 


Urine Opiates Screen  Presumptive negative   02/21/19  12:46    


 


Urine Methadone Screen  Presumptive negative   02/21/19  12:46    


 


Acetaminophen  < 5.0 ug/mL (10.0-30.0)  L  02/21/19  12:02    


 


Ur Barbiturates Screen  Presumptive negative   02/21/19  12:46    


 


Ur Phencyclidine Scrn  Presumptive negative   02/21/19  12:46    


 


Ur Amphetamines Screen  Presumptive negative   02/21/19  12:46    


 


U Benzodiazepines Scrn  Presumptive negative   02/21/19  12:46    


 


Urine Cocaine Screen  Presumptive negative   02/21/19  12:46    


 


U Marijuana (THC) Screen  Presumptive negative   02/21/19  12:46    


 


Drugs of Abuse Note  Disclamer   02/21/19  12:46    


 


Plasma/Serum Alcohol  < 0.01 % (0-0.07)   02/21/19  12:02    


 


HIV 1&2 Antibody Rapid  Non react  (Non React)   02/21/19  12:02    


 


HIV P24 Antigen  Non react  (Non React)   02/21/19  12:02    














Nutrition/Malnutrition Assess





- Dietary Evaluation


Nutrition/Malnutrition Findings: 


                                        





Nutrition Notes                                            Start:  02/27/19 

13:13


Freq:                                                      Status: Active       




Protocol:                                                                       




 Document     03/01/19 11:56  SA  (Rec: 03/01/19 12:22  SA  [id number])


 Co-Sign      03/01/19 11:56  LP


 Nutrition Notes


     Initial or Follow up                        Reassessment


     Current Diagnosis                           Acute Kidney Injury


                                                 COPD


                                                 Sepsis


     Other Pertinent Diagnosis                   ETOH dependence, GI bleed, UTI


                                                 , encephalopathy


     Current Diet                                GI soft


     Labs/Tests                                  Reviewed


     Pertinent Medications                       Reviewed


     Height                                      5 ft 8 in


     Weight                                      52.6 kg


     Ideal Body Weight (kg)                      70.00


     BMI                                         17.6


     Subjective/Other Information                Patient states appetite is


                                                 good. Pt reports eating 100%


                                                 of meals and drinking 100% of


                                                 ONS. Pt denies swallowing/


                                                 chewing difficulty. Patient


                                                 states UBW being 125-135#.


                                                 Patient denies N/V/D.


     Percent of energy/protein needs met:        100%/100%


     Burn                                        Absent


     Trauma                                      Absent


     #1


      Nutrition Diagnosis                        Malnutrition


      Diagnosis Progress(for reassessment        Continues


       documentation)                            


     Is patient on ventilator?                   No


     Is Patient Ambulatory and/or Out of Bed     Yes


     REE-([place]-St[place] Jeor-ambulatory/OOB) [     1749.150


      [id number]]                               


     Kcal/Kg value to use for calculation        39


     Approximate Energy Requirements Using       2051


      kcal/Kg                                    


     Calculation Used for Recommendations        Kcal/kg


     Additional Notes                            Protein Needs: 63-79g (1.2-1.


                                                 5g/kg)


                                                 Fluid Needs: 1 ml/kcal


 Nutrition Intervention


     Change Diet Order:                          Continue current


     Add Supplement/Snack (indicate name/kcal    Ensure Enlive 1 daily


      /protein )                                 


     Provides kCal:                              350


     Provides Protein (gm)                       20


     Goal #1                                     Meet at least 75% of calorie


                                                 and protein needs via PO and


                                                 ONS intakes


     Goal #2                                     Wt gain/maintenance


     Anticipated Discharge Needs:                unable to determine at this


                                                 time


     Follow-Up By:                               03/05/19


     Additional Comments                         F/U: PO and ONS intakes

## 2019-03-03 NOTE — CONSULTATION
HISTORY OF PRESENT ILLNESS:  This is a 52-year-old white male that is admitted

to [facility] on 02/21/2019 because of altered mental

status.  Emergency Room Department was called.  The patient was apparently

confused, not acting like his normal self and had had a fever.  Sister stated

that he is a daily drinker and was not able to get much of a history on

presentation to the hospital.  He has no known allergies.  He is not taking

medication.  Initial assessment was for altered mental status.  He had a CT scan

of the head, which was obtained on the day of admission.  I have had an

opportunity to review this and gray and white matter normal.  There is, however,

enlarged sulcal widening over the frontal lobes suggestive of frontal lobe

atrophy.  Posterior fossa structures are normal.  There is no cerebellar

atrophy.  The fourth ventricle has a normal shape and appearance.  On

presentation to the hospital, the patient's examination shows that he is very

confused, talks inappropriately.  His present electrolytes are unremarkable. 

His hematocrit is 31.9.  His white blood count of 7900.  He does have evidence

of urinary tract infection, however.  Serologies have not yet returned to the

chart and there are no reports of any toxicology since admission, but on the

admission note, his blood alcohol level was negative.  He does not show any

evidence of any illicit drugs such as cocaine or methamphetamine.  On the

initial evaluation, HIV was nonreactive.  Coagulation profile shows a PT of 13.7





PHYSICAL EXAMINATION:  Examination shows him to be alert, but highly

inappropriate, talking gibberish at times.  He can follow simple commands.  His

 strength is equal.  His motor tone is normal.  No tremors or asterixis are

noted.  No hypertonicity is present.  No dystonia is noted.  Visual fields are

fully, responds to questions well.  He is reactive and appropriate as far as

command following but he quickly drifts away and cannot be attentive to simple

questions.



IMPRESSION:  Altered mental status with encephalopathy.  We would recommend MRI

scan of brain.  Obtain EEG.  He does seem to have an organic delirium.  This

could represent Wernicke-Korsakoff syndrome and the alcoholic, especially with

his very bizarre responses.  I do not see any family members here to obtain a

cross history from, although I did note the sister's history of him being a

heavy drinker on a daily basis.





DD: 03/03/2019 10:49

DT: 03/03/2019 22:38

JOB# [id number]  [id number]

ALBERTO/MOHAMUD

## 2019-03-03 NOTE — XRAY REPORT
PROCEDURE:  XR CHEST 1V AP 

 

TECHNIQUE:  Chest, one view 

 

HISTORY: fever 

 

COMPARISON: 2/21/2019 

 

FINDINGS: 

 

The heart size is normal. 

There is no pulmonary vascular congestion seen. 

Mediastinal contours are normal. 

Lungs are clear. 

There is no pleural effusion seen. 

There is no pneumothorax seen. 

 

 

IMPRESSION: No acute abnormality identified. 

 

This document is electronically signed by Dionna Rhodes MD., March 3 2019 11:16:26 AM ET

## 2019-03-04 NOTE — PROGRESS NOTE
Subjective


Date of service: 03/04/19


Principal diagnosis: hematochezia


Interval history: 


patient manifests delirium and confusion... may need further work up suspect 

Wernicke Korsakoff Syndrome  EEG pending  Thanks will follow








Objective





- Vital Sign


                               Vital Signs - 12hr











  03/03/19 03/03/19 03/03/19





  20:39 22:00 22:59


 


Temperature   


 


Pulse Rate   


 


Pulse Rate [ 83  





From Monitor]   


 


Respiratory   18





Rate   


 


Respiratory  18 





Rate [R Wrist]   


 


Blood Pressure   


 


Blood Pressure   





[Right]   


 


O2 Sat by Pulse 94  





Oximetry   














  03/03/19 03/03/19 03/04/19





  23:00 23:59 04:59


 


Temperature 98.0 F  97.4 F L


 


Pulse Rate 66  75


 


Pulse Rate [   





From Monitor]   


 


Respiratory 20 18 18





Rate   


 


Respiratory   





Rate [R Wrist]   


 


Blood Pressure   109/73


 


Blood Pressure 103/66  





[Right]   


 


O2 Sat by Pulse   96





Oximetry   














- Laboratory Findings


CBC and BMP: 


                                 03/04/19 05:45





                                 03/04/19 05:45


Abnormal Lab Findings: 


                                  Abnormal Labs











  02/21/19 02/21/19 02/21/19





  12:02 12:02 12:02


 


WBC  12.2 H  


 


RBC   


 


Hgb   


 


Hct   


 


MCHC   


 


Lymph % (Auto)  7.5 L  


 


Mono % (Auto)  10.3 H  


 


Lymph #  0.9 L  


 


Mono #  1.2 H  


 


Seg Neutrophils %  82.0 H  


 


Seg Neutrophils #  10.0 H  


 


Sodium   135 L 


 


Chloride   93.2 L 


 


Carbon Dioxide   21 L 


 


BUN   55 H 


 


Creatinine   1.6 H 


 


Glucose   157 H 


 


POC Glucose   


 


Lactic Acid    3.60 H*


 


Magnesium   


 


ALT   < 5 L 


 


Ammonia   


 


Total Creatine Kinase   18 L 


 


Total Protein   8.4 H 


 


Albumin   


 


Urine pH   


 


Urine WBC (Auto)   


 


Salicylates   


 


Acetaminophen   














  02/21/19 02/21/19 02/21/19





  12:02 12:02 12:02


 


WBC   


 


RBC   


 


Hgb   


 


Hct   


 


MCHC   


 


Lymph % (Auto)   


 


Mono % (Auto)   


 


Lymph #   


 


Mono #   


 


Seg Neutrophils %   


 


Seg Neutrophils #   


 


Sodium   


 


Chloride   


 


Carbon Dioxide   


 


BUN   


 


Creatinine   


 


Glucose   


 


POC Glucose   


 


Lactic Acid   


 


Magnesium   


 


ALT   


 


Ammonia  78.0 H  


 


Total Creatine Kinase   


 


Total Protein   


 


Albumin   


 


Urine pH   


 


Urine WBC (Auto)   


 


Salicylates   < 0.3 L 


 


Acetaminophen    < 5.0 L














  02/21/19 02/21/19 02/21/19





  12:02 12:46 16:14


 


WBC   


 


RBC   


 


Hgb   


 


Hct   


 


MCHC   


 


Lymph % (Auto)   


 


Mono % (Auto)   


 


Lymph #   


 


Mono #   


 


Seg Neutrophils %   


 


Seg Neutrophils #   


 


Sodium   


 


Chloride   


 


Carbon Dioxide   


 


BUN   


 


Creatinine   


 


Glucose   


 


POC Glucose   


 


Lactic Acid    2.70 H*


 


Magnesium  2.70 H  


 


ALT   


 


Ammonia   


 


Total Creatine Kinase   


 


Total Protein   


 


Albumin   


 


Urine pH   8.0 H 


 


Urine WBC (Auto)   150.0 H 


 


Salicylates   


 


Acetaminophen   














  02/21/19 02/22/19 02/22/19





  23:04 05:00 05:00


 


WBC   


 


RBC   3.38 L 


 


Hgb   10.7 L 


 


Hct   33.4 L 


 


MCHC   


 


Lymph % (Auto)   


 


Mono % (Auto)   11.8 H 


 


Lymph #   


 


Mono #   1.1 H 


 


Seg Neutrophils %   73.9 H 


 


Seg Neutrophils #   


 


Sodium    136 L


 


Chloride   


 


Carbon Dioxide    19 L


 


BUN    52 H


 


Creatinine   


 


Glucose    126 H


 


POC Glucose  108 H  


 


Lactic Acid   


 


Magnesium   


 


ALT    < 5 L


 


Ammonia   


 


Total Creatine Kinase   


 


Total Protein   


 


Albumin    3.1 L


 


Urine pH   


 


Urine WBC (Auto)   


 


Salicylates   


 


Acetaminophen   














  02/22/19 02/22/19 02/23/19





  16:06 21:58 05:19


 


WBC   


 


RBC    3.31 L


 


Hgb    10.5 L


 


Hct    30.8 L


 


MCHC   


 


Lymph % (Auto)   


 


Mono % (Auto)    14.3 H


 


Lymph #   


 


[place] #    1.0 H


 


Seg Neutrophils %   


 


Seg Neutrophils #   


 


Sodium   


 


Chloride   


 


Carbon Dioxide   


 


BUN   


 


Creatinine   


 


Glucose   


 


POC Glucose  124 H  112 H 


 


Lactic Acid   


 


Magnesium   


 


ALT   


 


Ammonia   


 


Total Creatine Kinase   


 


Total Protein   


 


Albumin   


 


Urine pH   


 


Urine WBC (Auto)   


 


Salicylates   


 


Acetaminophen   














  02/23/19 02/24/19 02/24/19





  05:19 10:33 21:11


 


WBC   


 


RBC   


 


Hgb   


 


Hct   


 


MCHC   


 


Lymph % (Auto)   


 


Mono % (Auto)   


 


Lymph #   


 


Mono #   


 


Seg Neutrophils %   


 


Seg Neutrophils #   


 


Sodium   


 


Chloride   


 


Carbon Dioxide  18 L  


 


BUN  50 H  


 


Creatinine   


 


Glucose  118 H  


 


POC Glucose   128 H  128 H


 


Lactic Acid   


 


Magnesium   


 


ALT   


 


Ammonia   


 


Total Creatine Kinase   


 


Total Protein   


 


Albumin   


 


Urine pH   


 


Urine WBC (Auto)   


 


Salicylates   


 


Acetaminophen   














  02/25/19 02/25/19 02/26/19





  17:09 17:09 02:13


 


WBC   


 


RBC   


 


Hgb   


 


Hct  34.4 L  


 


MCHC  35 H  


 


Lymph % (Auto)   


 


Mono % (Auto)   


 


Lymph #   


 


Mono #   


 


Seg Neutrophils %   


 


Seg Neutrophils #   


 


Sodium   


 


Chloride   


 


Carbon Dioxide   17 L 


 


BUN   34 H 


 


Creatinine   


 


Glucose   132 H 


 


POC Glucose    144 H


 


Lactic Acid   


 


Magnesium   


 


ALT   


 


Ammonia   


 


Total Creatine Kinase   


 


Total Protein   


 


Albumin   


 


Urine pH   


 


Urine WBC (Auto)   


 


Salicylates   


 


Acetaminophen   














  02/26/19 02/26/19 02/26/19





  09:24 17:13 Unknown


 


WBC   


 


RBC   


 


Hgb   


 


Hct   


 


MCHC   


 


Lymph % (Auto)   


 


Mono % (Auto)   


 


Lymph #   


 


Mono #   


 


Seg Neutrophils %   


 


Seg Neutrophils #   


 


Sodium   


 


Chloride   


 


Carbon Dioxide   


 


BUN   


 


Creatinine   


 


Glucose   


 


POC Glucose  134 H  109 H 


 


Lactic Acid   


 


Magnesium   


 


ALT   


 


Ammonia   


 


Total Creatine Kinase   


 


Total Protein   


 


Albumin   


 


Urine pH   


 


Urine WBC (Auto)    > 182.0 H


 


Salicylates   


 


Acetaminophen   














  02/28/19 03/01/19 03/01/19





  05:28 05:22 05:22


 


WBC   


 


RBC   3.47 L 


 


Hgb   10.8 L 


 


Hct   31.9 L 


 


MCHC   


 


Lymph % (Auto)   


 


Mono % (Auto)   


 


Lymph #   


 


Mono #   


 


Seg Neutrophils %   


 


Seg Neutrophils #   


 


Sodium    135 L


 


Chloride   


 


Carbon Dioxide   


 


BUN   


 


Creatinine    0.7 L


 


Glucose    107 H


 


POC Glucose  108 H  


 


Lactic Acid   


 


Magnesium   


 


ALT   


 


Ammonia   


 


Total Creatine Kinase   


 


Total Protein   


 


Albumin   


 


Urine pH   


 


Urine WBC (Auto)   


 


Salicylates   


 


Acetaminophen   














  03/01/19 03/03/19 03/04/19





  13:16 13:52 05:45


 


WBC   


 


RBC    3.50 L


 


Hgb    10.9 L


 


Hct    31.8 L


 


MCHC   


 


Lymph % (Auto)   


 


Mono % (Auto)   


 


Lymph #   


 


Mono #   


 


Seg Neutrophils %   


 


Seg Neutrophils #   


 


Sodium   


 


Chloride   


 


Carbon Dioxide   


 


BUN   


 


Creatinine   


 


Glucose   


 


POC Glucose  110 H  


 


Lactic Acid   


 


Magnesium   


 


ALT   


 


Ammonia   


 


Total Creatine Kinase   


 


Total Protein   


 


Albumin   


 


Urine pH   


 


Urine WBC (Auto)   > 182.0 H 


 


Salicylates   


 


Acetaminophen   














  03/04/19





  05:45


 


WBC 


 


RBC 


 


Hgb 


 


Hct 


 


MCHC 


 


Lymph % (Auto) 


 


Mono % (Auto) 


 


Lymph # 


 


Mono # 


 


Seg Neutrophils % 


 


Seg Neutrophils # 


 


Sodium 


 


Chloride  96.0 L


 


Carbon Dioxide 


 


BUN 


 


Creatinine 


 


Glucose  113 H


 


POC Glucose 


 


Lactic Acid 


 


Magnesium 


 


ALT 


 


Ammonia 


 


Total Creatine Kinase 


 


Total Protein 


 


Albumin 


 


Urine pH 


 


Urine WBC (Auto) 


 


Salicylates 


 


Acetaminophen

## 2019-03-04 NOTE — MAGNETIC RESONANCE REPORT
PROCEDURE: MR BRAIN WO CON 

 

TECHNIQUE:  Multiplanar multisequence noncontrast MR images of the brain were performed 

 

HISTORY: seizure 

 

COMPARISONS: CT brain 2/21/2019  

 

FINDINGS: 

 

Fully formed corpus callosum. 

 

Unremarkable sella turcica, posterior pituitary bright spot, colliculi, brainstem, and posterior desi
a. 

 

Right facial superficial subcutaneous 2 cm cystic lesion. 

 

No focal restricted diffusion. 

 

No blood products. 

 

Normal T2 flow voids at the base the calvarium. 

 

Right nasal lacrimal or nasal bridge 6 mm cystic lesion. 

 

Mild cortical and cerebellar atrophy. 

 

Normal vestibular cochlear nerve sheath bundles and cerebellopontine angles. 

 

Conjugate gaze. Orbital cones and and apices are unremarkable. 

 

Minimal periventricular white matter prolonged T2 signal intensity in the left centrum semiovale. 

 

Infundibulum and optic chiasm are unremarkable. 

 

No midline shift. No mass effect. 

 

No intraventricular blood products. No extra-axial fluid collections. 

 

IMPRESSION:  

 

No acute restricted diffusion. 

 

No acute blood products. 

 

Small completed left centrum chronic semiovale lacunar infarct. 

 

Slightly advanced cortical atrophy for age. 

 

Right maxillary eminence and nasolacrimal/nasal bridge cystic lesions. Correlate with physical exam. 


 

. 

 

This document is electronically signed by Yoselyn Nuñez MD., March 4 2019 06:32:34 PM ET

## 2019-03-04 NOTE — PROGRESS NOTE
Assessment and Plan


Assessment and plan: 


Patient is a 51 yo man with PMH of ETOH dependence, COPD, and malnutrition who 

presented to ED with AMS and was found to have encephalopathy, sepsis 2/2 UTI, 

and acute renal failure upon admission. There were also reports of rectal 

bleeding per family, GI evaluated. Patient on alcohol withdrawal protocol, 

restraints, no plan for endoscopy now. 





* Renal Ultrasound IMPRESSION: 1. Appearance of marked increased thickness of 

  the urinary bladder wall. Balloon catheter within the urinary bladder. 2. 

  Unremarkable appearance of the kidneys. 3. Gallstones within the gallbladder. 

  There is no demonstration of hydronephrosis, renal calculi or renal mass of 

  either kidney. 





/ EtOH dependence with intoxication, now in withdrawal: treat with [facility], did 

poorly with PT, has h/o heavy alcohol drinking, cont Thiamine, Folic acid, 

multivitamin, CIWA protocol with librium and ativan as needed.


/Sepsis due to UTI with suspected obstruction/Urinary retention: Sepsis 

protocol, cont IV antibiotic therapy, monitor uop q shift, IVF resuscitation 

therapy, follow Cx, renal u/s reviewed, no hydronephrosis


/GI bleed, resolved: GI consulted in ED, cont ppi therapy, stable h/h,  

supportive care. No plan for endoscopy


/Hematuria with Urinary Obstruction/Retention, PVR over 900 on 2/26/19, monae 

placed, now gross hematuria: Urology note reviewed, 


/Acute toxic Encephalopathy, likely from alcohol withdrawal, CT Head no acute 

finding, cont neuro check, seizure precautions, CIWA protocol, aspiration 

precautions, treat sepsis. Consult psych


/ARF (acute renal failure) with vasomotor nephropathy: cont IVF resuscitation, 

monitor uop q shift, repeat bmp in the am, renal u/s reviewed


/UTI (urinary tract infection) complicated/complex: cont IV antibiotic therapy, 

supportive care, urine Cx


/Severe malnutrition most likely, poa: Dietician consult


/DVT prophylaxis: SCD to BLE while in bed





During hospitalization I spoke with sister Jax [phone] who was visiting


Disposition: continue inpatient care, d/c once mental status improves/ETOH WD 

resolves. Continued restraints





Patient still confused, CIWA score 7


EEG pending








History


Interval history: 


Patient was seen and examined. Follow-up on current diagnosis of AMS, still 

present. Overnight uneventful. Patient is confused. Imaging, nursing note, 

chart, labs and old chart reviewed. Discussed with patient. 





Hospitalist Physical





- Physical exam


Narrative exam: 


Gen: thin frail, NAD, Awake, Alert but confused


HEENT: NCAT, EOMI, PERRL, OP Clear 


Neck: supple, no adenopathy, no thyromegaly, no JVD 


CVS/Heart: Regular tachycardia, normal S1S2, pulses present bilaterally 


Chest/Lungs: CTA B, Symmetrical chest expansion, good air entry bilaterally 


GI/Abdomen: soft, NTND, good bowel sounds, no guarding or rebound 


/Bladder: no suprapubic tenderness, no CVA or paraspinal tenderness 


Extermity/Skin: no c/c/e, no obvious rash 


MSK: FROM x 4 


Neuro: CN 2-12 grossly intact, doesn't follow commands


Psych: confused, ignores instructions 





- Constitutional


Vitals: 


                                        











Temp Pulse Resp BP Pulse Ox


 


 97.9 F   91 H  20   92/65   97 


 


 03/04/19 13:40  03/04/19 13:40  03/04/19 13:40  03/04/19 13:40  03/04/19 13:40











General appearance: Present: no acute distress





Results





- Labs


CBC & Chem 7: 


                                 03/04/19 05:45





                                 03/04/19 05:45


Labs: 


                             Laboratory Last Values











WBC  6.7 K/mm3 (4.5-11.0)   03/04/19  05:45    


 


RBC  3.50 M/mm3 (3.65-5.03)  L  03/04/19  05:45    


 


Hgb  10.9 gm/dl (11.8-15.2)  L  03/04/19  05:45    


 


Hct  31.8 % (35.5-45.6)  L  03/04/19  05:45    


 


MCV  91 fl (84-94)   03/04/19  05:45    


 


MCH  31 pg (28-32)   03/04/19  05:45    


 


MCHC  34 % (32-34)   03/04/19  05:45    


 


RDW  13.7 % (13.2-15.2)   03/04/19  05:45    


 


Plt Count  357 K/mm3 (140-440)   03/04/19  05:45    


 


Lymph % (Auto)  18.6 % (13.4-35.0)   02/23/19  05:19    


 


Mono % (Auto)  14.3 % (0.0-7.3)  H  02/23/19  05:19    


 


Eos % (Auto)  0.8 % (0.0-4.3)   02/23/19  05:19    


 


Baso % (Auto)  0.5 % (0.0-1.8)   02/23/19  05:19    


 


Lymph #  1.3 K/mm3 (1.2-5.4)   02/23/19  05:19    


 


Mono #  1.0 K/mm3 (0.0-0.8)  H  02/23/19  05:19    


 


Eos #  0.1 K/mm3 (0.0-0.4)   02/23/19  05:19    


 


Baso #  0.0 K/mm3 (0.0-0.1)   02/23/19  05:19    


 


Seg Neutrophils %  65.8 % (40.0-70.0)   02/23/19  05:19    


 


Seg Neutrophils #  4.7 K/mm3 (1.8-7.7)   02/23/19  05:19    


 


PT  13.7 Sec. (12.2-14.9)   02/21/19  12:02    


 


INR  0.99  (0.87-1.13)   02/21/19  12:02    


 


APTT  27.9 Sec. (24.2-36.6)   02/21/19  12:02    


 


VBG pH  7.392  (7.320-7.420)   02/21/19  12:02    


 


Sodium  137 mmol/L (137-145)   03/04/19  05:45    


 


Potassium  4.1 mmol/L (3.6-5.0)   03/04/19  05:45    


 


Chloride  96.0 mmol/L ()  L  03/04/19  05:45    


 


Carbon Dioxide  30 mmol/L (22-30)   03/04/19  05:45    


 


Anion Gap  15 mmol/L  03/04/19  05:45    


 


BUN  13 mg/dL (9-20)   03/04/19  05:45    


 


Creatinine  0.8 mg/dL (0.8-1.5)   03/04/19  05:45    


 


Estimated GFR  > 60 ml/min  03/04/19  05:45    


 


BUN/Creatinine Ratio  16 %  03/04/19  05:45    


 


Glucose  113 mg/dL ()  H  03/04/19  05:45    


 


POC Glucose  110  ()  H  03/01/19  13:16    


 


Lactic Acid  1.50 mmol/L (0.7-2.0)   02/21/19  22:57    


 


Calcium  8.7 mg/dL (8.4-10.2)   03/04/19  05:45    


 


Magnesium  2.70 mg/dL (1.7-2.3)  H  02/21/19  12:02    


 


Total Bilirubin  0.50 mg/dL (0.1-1.2)   02/22/19  05:00    


 


AST  8 units/L (5-40)   02/22/19  05:00    


 


ALT  < 5 units/L (7-56)  L  02/22/19  05:00    


 


Alkaline Phosphatase  45 units/L ()   02/22/19  05:00    


 


Ammonia  78.0 umol/L (25-60)  H  02/21/19  12:02    


 


Total Creatine Kinase  18 units/L ()  L  02/21/19  12:02    


 


Troponin T  < 0.010 ng/mL (0.00-0.029)   02/21/19  12:02    


 


Total Protein  6.5 g/dL (6.3-8.2)  D 02/22/19  05:00    


 


Albumin  3.1 g/dL (3.9-5)  L  02/22/19  05:00    


 


Albumin/Globulin Ratio  0.9 %  02/22/19  05:00    


 


TSH  0.966 mlU/mL (0.270-4.200)   02/21/19  12:02    


 


Free T4  1.35 ng/dL (0.76-1.46)   02/21/19  12:02    


 


Urine Color  Yellow  (Yellow)   03/03/19  13:52    


 


Urine Turbidity  Cloudy  (Clear)   03/03/19  13:52    


 


Urine pH  5.0  (5.0-7.0)   03/03/19  13:52    


 


Ur Specific Gravity  1.018  (1.003-1.030)   03/03/19  13:52    


 


Urine Protein  100 mg/dl mg/dL (Negative)   03/03/19  13:52    


 


Urine Glucose (UA)  50 mg/dL (Negative)   03/03/19  13:52    


 


Urine Ketones  Neg mg/dL (Negative)   03/03/19  13:52    


 


Urine Blood  Lg  (Negative)   03/03/19  13:52    


 


Urine Nitrite  Neg  (Negative)   03/03/19  13:52    


 


Urine Bilirubin  Neg  (Negative)   03/03/19  13:52    


 


Urine Urobilinogen  < 2.0 mg/dL (<2.0)   03/03/19  13:52    


 


Ur Leukocyte Esterase  Lg  (Negative)   03/03/19  13:52    


 


Urine WBC (Auto)  > 182.0 /HPF (0.0-6.0)  H  03/03/19  13:52    


 


Urine RBC (Auto)  > 182.0 /HPF (0.0-6.0)   03/03/19  13:52    


 


U Epithel Cells (Auto)  1.0 /HPF (0-13.0)   03/03/19  13:52    


 


Urine Bacteria (Auto)  2+ /HPF (Negative)   02/21/19  12:46    


 


Urine WBC Clumps  1+ /HPF  03/03/19  13:52    


 


Urine Mucus  1+ /HPF  03/03/19  13:52    


 


Salicylates  < 0.3 mg/dL (2.8-20.0)  L  02/21/19  12:02    


 


Urine Opiates Screen  Presumptive negative   02/21/19  12:46    


 


Urine Methadone Screen  Presumptive negative   02/21/19  12:46    


 


Acetaminophen  < 5.0 ug/mL (10.0-30.0)  L  02/21/19  12:02    


 


Ur Barbiturates Screen  Presumptive negative   02/21/19  12:46    


 


Ur Phencyclidine Scrn  Presumptive negative   02/21/19  12:46    


 


Ur Amphetamines Screen  Presumptive negative   02/21/19  12:46    


 


U Benzodiazepines Scrn  Presumptive negative   02/21/19  12:46    


 


Urine Cocaine Screen  Presumptive negative   02/21/19  12:46    


 


U Marijuana (THC) Screen  Presumptive negative   02/21/19  12:46    


 


Drugs of Abuse Note  Disclamer   02/21/19  12:46    


 


Plasma/Serum Alcohol  < 0.01 % (0-0.07)   02/21/19  12:02    


 


HIV 1&2 Antibody Rapid  Non react  (Non React)   02/21/19  12:02    


 


HIV P24 Antigen  Non react  (Non React)   02/21/19  12:02    














Nutrition/Malnutrition Assess





- Dietary Evaluation


Nutrition/Malnutrition Findings: 


                                        





Nutrition Notes                                            Start:  02/27/19 

13:13


Freq:                                                      Status: Active       




Protocol:                                                                       




 Document     03/01/19 11:56  SA  (Rec: 03/01/19 12:22  SA  [id number])


 Co-Sign      03/01/19 11:56  LP


 Nutrition Notes


     Initial or Follow up                        Reassessment


     Current Diagnosis                           Acute Kidney Injury


                                                 COPD


                                                 Sepsis


     Other Pertinent Diagnosis                   ETOH dependence, GI bleed, UTI


                                                 , encephalopathy


     Current Diet                                GI soft


     Labs/Tests                                  Reviewed


     Pertinent Medications                       Reviewed


     Height                                      5 ft 8 in


     Weight                                      52.6 kg


     Ideal Body Weight (kg)                      70.00


     BMI                                         17.6


     Subjective/Other Information                Patient states appetite is


                                                 good. Pt reports eating 100%


                                                 of meals and drinking 100% of


                                                 ONS. Pt denies swallowing/


                                                 chewing difficulty. Patient


                                                 states UBW being 125-135#.


                                                 Patient denies N/V/D.


     Percent of energy/protein needs met:        100%/100%


     Burn                                        Absent


     Trauma                                      Absent


     #1


      Nutrition Diagnosis                        Malnutrition


      Diagnosis Progress(for reassessment        Continues


       documentation)                            


     Is patient on ventilator?                   No


     Is Patient Ambulatory and/or Out of Bed     Yes


     REE-([facility]-ambulatory/OOB) [     1749.150


      [email or web address]]                               


     Kcal/Kg value to use for calculation        39


     Approximate Energy Requirements Using       2051


      kcal/Kg                                    


     Calculation Used for Recommendations        Kcal/kg


     Additional Notes                            Protein Needs: 63-79g (1.2-1.


                                                 5g/kg)


                                                 Fluid Needs: 1 ml/kcal


 Nutrition Intervention


     Change Diet Order:                          Continue current


     Add Supplement/Snack (indicate name/kcal    Ensure Enlive 1 daily


      /protein )                                 


     Provides kCal:                              350


     Provides Protein (gm)                       20


     Goal #1                                     Meet at least 75% of calorie


                                                 and protein needs via PO and


                                                 ONS intakes


     Goal #2                                     Wt gain/maintenance


     Anticipated Discharge Needs:                unable to determine at this


                                                 time


     Follow-Up By:                               03/05/19


     Additional Comments                         F/U: PO and ONS intakes

## 2019-03-05 NOTE — PROGRESS NOTE
Subjective


Date of service: 03/05/19


Principal diagnosis: hematochezia


Interval history: 


eating well fully alert  no focal weakness still very confused and inappropriate

  EEG showed slowing but no epileptiform discharges  EEG suggestive of Wernicke 

Korsakoff syndrome








Objective





- Vital Sign


                               Vital Signs - 12hr











  03/05/19 03/05/19





  05:51 10:56


 


Temperature 98.3 F 98.2 F


 


Pulse Rate 85 87


 


Respiratory 16 18





Rate  


 


Blood Pressure 111/79 107/79


 


O2 Sat by Pulse 99 93





Oximetry  














- Laboratory Findings


CBC and BMP: 


                                 03/04/19 05:45





                                 03/04/19 05:45


Abnormal Lab Findings: 


                                  Abnormal Labs











  02/21/19 02/21/19 02/21/19





  12:02 12:02 12:02


 


WBC  12.2 H  


 


RBC   


 


Hgb   


 


Hct   


 


MCHC   


 


Lymph % (Auto)  7.5 L  


 


Mono % (Auto)  10.3 H  


 


Lymph #  0.9 L  


 


Mono #  1.2 H  


 


Seg Neutrophils %  82.0 H  


 


Seg Neutrophils #  10.0 H  


 


Sodium   135 L 


 


Chloride   93.2 L 


 


Carbon Dioxide   21 L 


 


BUN   55 H 


 


Creatinine   1.6 H 


 


Glucose   157 H 


 


POC Glucose   


 


Lactic Acid    3.60 H*


 


Magnesium   


 


ALT   < 5 L 


 


Ammonia   


 


Total Creatine Kinase   18 L 


 


Total Protein   8.4 H 


 


Albumin   


 


Urine pH   


 


Urine WBC (Auto)   


 


Salicylates   


 


Acetaminophen   














  02/21/19 02/21/19 02/21/19





  12:02 12:02 12:02


 


WBC   


 


RBC   


 


Hgb   


 


Hct   


 


MCHC   


 


Lymph % (Auto)   


 


Mono % (Auto)   


 


Lymph #   


 


Mono #   


 


Seg Neutrophils %   


 


Seg Neutrophils #   


 


Sodium   


 


Chloride   


 


Carbon Dioxide   


 


BUN   


 


Creatinine   


 


Glucose   


 


POC Glucose   


 


Lactic Acid   


 


Magnesium   


 


ALT   


 


Ammonia  78.0 H  


 


Total Creatine Kinase   


 


Total Protein   


 


Albumin   


 


Urine pH   


 


Urine WBC (Auto)   


 


Salicylates   < 0.3 L 


 


Acetaminophen    < 5.0 L














  02/21/19 02/21/19 02/21/19





  12:02 12:46 16:14


 


WBC   


 


RBC   


 


Hgb   


 


Hct   


 


MCHC   


 


Lymph % (Auto)   


 


Mono % (Auto)   


 


Lymph #   


 


Mono #   


 


Seg Neutrophils %   


 


Seg Neutrophils #   


 


Sodium   


 


Chloride   


 


Carbon Dioxide   


 


BUN   


 


Creatinine   


 


Glucose   


 


POC Glucose   


 


Lactic Acid    2.70 H*


 


Magnesium  2.70 H  


 


ALT   


 


Ammonia   


 


Total Creatine Kinase   


 


Total Protein   


 


Albumin   


 


Urine pH   8.0 H 


 


Urine WBC (Auto)   150.0 H 


 


Salicylates   


 


Acetaminophen   














  02/21/19 02/22/19 02/22/19





  23:04 05:00 05:00


 


WBC   


 


RBC   3.38 L 


 


Hgb   10.7 L 


 


Hct   33.4 L 


 


MCHC   


 


Lymph % (Auto)   


 


Mono % (Auto)   11.8 H 


 


Lymph #   


 


Mono #   1.1 H 


 


Seg Neutrophils %   73.9 H 


 


Seg Neutrophils #   


 


Sodium    136 L


 


Chloride   


 


Carbon Dioxide    19 L


 


BUN    52 H


 


Creatinine   


 


Glucose    126 H


 


POC Glucose  108 H  


 


Lactic Acid   


 


Magnesium   


 


ALT    < 5 L


 


Ammonia   


 


Total Creatine Kinase   


 


Total Protein   


 


Albumin    3.1 L


 


Urine pH   


 


Urine WBC (Auto)   


 


Salicylates   


 


Acetaminophen   














  02/22/19 02/22/19 02/23/19





  16:06 21:58 05:19


 


WBC   


 


RBC    3.31 L


 


Hgb    10.5 L


 


Hct    30.8 L


 


MCHC   


 


Lymph % (Auto)   


 


Mono % (Auto)    14.3 H


 


Lymph #   


 


Hodgeman #    1.0 H


 


Seg Neutrophils %   


 


Seg Neutrophils #   


 


Sodium   


 


Chloride   


 


Carbon Dioxide   


 


BUN   


 


Creatinine   


 


Glucose   


 


POC Glucose  124 H  112 H 


 


Lactic Acid   


 


Magnesium   


 


ALT   


 


Ammonia   


 


Total Creatine Kinase   


 


Total Protein   


 


Albumin   


 


Urine pH   


 


Urine WBC (Auto)   


 


Salicylates   


 


Acetaminophen   














  02/23/19 02/24/19 02/24/19





  05:19 10:33 21:11


 


WBC   


 


RBC   


 


Hgb   


 


Hct   


 


MCHC   


 


Lymph % (Auto)   


 


Mono % (Auto)   


 


Lymph #   


 


Mono #   


 


Seg Neutrophils %   


 


Seg Neutrophils #   


 


Sodium   


 


Chloride   


 


Carbon Dioxide  18 L  


 


BUN  50 H  


 


Creatinine   


 


Glucose  118 H  


 


POC Glucose   128 H  128 H


 


Lactic Acid   


 


Magnesium   


 


ALT   


 


Ammonia   


 


Total Creatine Kinase   


 


Total Protein   


 


Albumin   


 


Urine pH   


 


Urine WBC (Auto)   


 


Salicylates   


 


Acetaminophen   














  02/25/19 02/25/19 02/26/19





  17:09 17:09 02:13


 


WBC   


 


RBC   


 


Hgb   


 


Hct  34.4 L  


 


MCHC  35 H  


 


Lymph % (Auto)   


 


Mono % (Auto)   


 


Lymph #   


 


Mono #   


 


Seg Neutrophils %   


 


Seg Neutrophils #   


 


Sodium   


 


Chloride   


 


Carbon Dioxide   17 L 


 


BUN   34 H 


 


Creatinine   


 


Glucose   132 H 


 


POC Glucose    144 H


 


Lactic Acid   


 


Magnesium   


 


ALT   


 


Ammonia   


 


Total Creatine Kinase   


 


Total Protein   


 


Albumin   


 


Urine pH   


 


Urine WBC (Auto)   


 


Salicylates   


 


Acetaminophen   














  02/26/19 02/26/19 02/26/19





  09:24 17:13 Unknown


 


WBC   


 


RBC   


 


Hgb   


 


Hct   


 


MCHC   


 


Lymph % (Auto)   


 


Mono % (Auto)   


 


Lymph #   


 


Mono #   


 


Seg Neutrophils %   


 


Seg Neutrophils #   


 


Sodium   


 


Chloride   


 


Carbon Dioxide   


 


BUN   


 


Creatinine   


 


Glucose   


 


POC Glucose  134 H  109 H 


 


Lactic Acid   


 


Magnesium   


 


ALT   


 


Ammonia   


 


Total Creatine Kinase   


 


Total Protein   


 


Albumin   


 


Urine pH   


 


Urine WBC (Auto)    > 182.0 H


 


Salicylates   


 


Acetaminophen   














  02/28/19 03/01/19 03/01/19





  05:28 05:22 05:22


 


WBC   


 


RBC   3.47 L 


 


Hgb   10.8 L 


 


Hct   31.9 L 


 


MCHC   


 


Lymph % (Auto)   


 


Mono % (Auto)   


 


Lymph #   


 


Mono #   


 


Seg Neutrophils %   


 


Seg Neutrophils #   


 


Sodium    135 L


 


Chloride   


 


Carbon Dioxide   


 


BUN   


 


Creatinine    0.7 L


 


Glucose    107 H


 


POC Glucose  108 H  


 


Lactic Acid   


 


Magnesium   


 


ALT   


 


Ammonia   


 


Total Creatine Kinase   


 


Total Protein   


 


Albumin   


 


Urine pH   


 


Urine WBC (Auto)   


 


Salicylates   


 


Acetaminophen   














  03/01/19 03/03/19 03/04/19





  13:16 13:52 05:45


 


WBC   


 


RBC    3.50 L


 


Hgb    10.9 L


 


Hct    31.8 L


 


MCHC   


 


Lymph % (Auto)   


 


Mono % (Auto)   


 


Lymph #   


 


Mono #   


 


Seg Neutrophils %   


 


Seg Neutrophils #   


 


Sodium   


 


Chloride   


 


Carbon Dioxide   


 


BUN   


 


Creatinine   


 


Glucose   


 


POC Glucose  110 H  


 


Lactic Acid   


 


Magnesium   


 


ALT   


 


Ammonia   


 


Total Creatine Kinase   


 


Total Protein   


 


Albumin   


 


Urine pH   


 


Urine WBC (Auto)   > 182.0 H 


 


Salicylates   


 


Acetaminophen   














  03/04/19





  05:45


 


WBC 


 


RBC 


 


Hgb 


 


Hct 


 


MCHC 


 


Lymph % (Auto) 


 


Mono % (Auto) 


 


Lymph # 


 


Mono # 


 


Seg Neutrophils % 


 


Seg Neutrophils # 


 


Sodium 


 


Chloride  96.0 L


 


Carbon Dioxide 


 


BUN 


 


Creatinine 


 


Glucose  113 H


 


POC Glucose 


 


Lactic Acid 


 


Magnesium 


 


ALT 


 


Ammonia 


 


Total Creatine Kinase 


 


Total Protein 


 


Albumin 


 


Urine pH 


 


Urine WBC (Auto) 


 


Salicylates 


 


Acetaminophen

## 2019-03-05 NOTE — PROGRESS NOTE
Assessment and Plan


Assessment and plan: 


Patient is a 51 yo man with PMH of ETOH dependence, COPD, and malnutrition who 

presented to ED with AMS and was found to have encephalopathy, sepsis 2/2 UTI, 

and acute renal failure upon admission. There were also reports of rectal 

bleeding per family, GI evaluated. Patient on alcohol withdrawal protocol, 

restraints, no plan for endoscopy now. 





* Renal Ultrasound IMPRESSION: 1. Appearance of marked increased thickness of 

  the urinary bladder wall. Balloon catheter within the urinary bladder. 2. 

  Unremarkable appearance of the kidneys. 3. Gallstones within the gallbladder. 

  There is no demonstration of hydronephrosis, renal calculi or renal mass of 

  either kidney. 





/ EtOH dependence with intoxication, now in withdrawal: treat with [facility], did 

poorly with PT, has h/o heavy alcohol drinking, cont Thiamine, Folic acid, 

multivitamin, CIWA protocol with librium and ativan as needed.


/Sepsis due to UTI with suspected obstruction/Urinary retention: Sepsis 

protocol, cont IV antibiotic therapy, monitor uop q shift, IVF resuscitation 

therapy, follow Cx, renal u/s reviewed, no hydronephrosis


/GI bleed, resolved: GI consulted in ED, cont ppi therapy, stable h/h,  

supportive care. No plan for endoscopy


/Hematuria with Urinary Obstruction/Retention, PVR over 900 on 2/26/19, monae 

placed, now gross hematuria: Urology note reviewed, 


/Acute toxic Encephalopathy, likely from alcohol withdrawal, CT Head no acute 

finding, cont neuro check, seizure precautions, CIWA protocol, aspiration 

precautions, treat sepsis. Consult psych


/ARF (acute renal failure) with vasomotor nephropathy: cont IVF resuscitation, 

monitor uop q shift, repeat bmp in the am, renal u/s reviewed


/UTI (urinary tract infection) complicated/complex: cont IV antibiotic therapy, 

supportive care, urine Cx


/Severe malnutrition most likely, bmi 17.5 poa: Dietician consult


/DVT prophylaxis: SCD to BLE while in bed





During hospitalization I spoke with sister Jax [phone] who was visiting


Disposition: continue inpatient care, d/c once mental status improves/ETOH WD 

resolves. Continued restraints





Patient still confused, CIWA score still 7


EEG per Dr. Michael








History


Interval history: 


Patient was seen and examined. Follow-up on current diagnosis of AMS, still 

present. Overnight uneventful. Patient is confused. Imaging, nursing note, 

chart, labs and old chart reviewed. Discussed with patient. 





Hospitalist Physical





- Physical exam


Narrative exam: 


Gen: thin frail, NAD, Awake, Alert but confused


HEENT: NCAT, EOMI, PERRL, OP Clear 


Neck: supple, no adenopathy, no thyromegaly, no JVD 


CVS/Heart: Regular tachycardia, normal S1S2, pulses present bilaterally 


Chest/Lungs: CTA B, Symmetrical chest expansion, good air entry bilaterally 


GI/Abdomen: soft, NTND, good bowel sounds, no guarding or rebound 


/Bladder: no suprapubic tenderness, no CVA or paraspinal tenderness 


Extermity/Skin: no c/c/e, no obvious rash 


MSK: FROM x 4 


Neuro: CN 2-12 grossly intact, doesn't follow commands


Psych: confused, ignores instructions 





- Constitutional


Vitals: 


                                        











Temp Pulse Resp BP Pulse Ox


 


 98.2 F   87   18   107/79   93 


 


 03/05/19 10:56  03/05/19 10:56  03/05/19 10:56  03/05/19 10:56  03/05/19 10:56











General appearance: Present: no acute distress





Results





- Labs


CBC & Chem 7: 


                                 03/04/19 05:45





                                 03/04/19 05:45


Labs: 


                             Laboratory Last Values











WBC  6.7 K/mm3 (4.5-11.0)   03/04/19  05:45    


 


RBC  3.50 M/mm3 (3.65-5.03)  L  03/04/19  05:45    


 


Hgb  10.9 gm/dl (11.8-15.2)  L  03/04/19  05:45    


 


Hct  31.8 % (35.5-45.6)  L  03/04/19  05:45    


 


MCV  91 fl (84-94)   03/04/19  05:45    


 


MCH  31 pg (28-32)   03/04/19  05:45    


 


MCHC  34 % (32-34)   03/04/19  05:45    


 


RDW  13.7 % (13.2-15.2)   03/04/19  05:45    


 


Plt Count  357 K/mm3 (140-440)   03/04/19  05:45    


 


Lymph % (Auto)  18.6 % (13.4-35.0)   02/23/19  05:19    


 


Mono % (Auto)  14.3 % (0.0-7.3)  H  02/23/19  05:19    


 


Eos % (Auto)  0.8 % (0.0-4.3)   02/23/19  05:19    


 


Baso % (Auto)  0.5 % (0.0-1.8)   02/23/19  05:19    


 


Lymph #  1.3 K/mm3 (1.2-5.4)   02/23/19  05:19    


 


Mono #  1.0 K/mm3 (0.0-0.8)  H  02/23/19  05:19    


 


Eos #  0.1 K/mm3 (0.0-0.4)   02/23/19  05:19    


 


Baso #  0.0 K/mm3 (0.0-0.1)   02/23/19  05:19    


 


Seg Neutrophils %  65.8 % (40.0-70.0)   02/23/19  05:19    


 


Seg Neutrophils #  4.7 K/mm3 (1.8-7.7)   02/23/19  05:19    


 


PT  13.7 Sec. (12.2-14.9)   02/21/19  12:02    


 


INR  0.99  (0.87-1.13)   02/21/19  12:02    


 


APTT  27.9 Sec. (24.2-36.6)   02/21/19  12:02    


 


VBG pH  7.392  (7.320-7.420)   02/21/19  12:02    


 


Sodium  137 mmol/L (137-145)   03/04/19  05:45    


 


Potassium  4.1 mmol/L (3.6-5.0)   03/04/19  05:45    


 


Chloride  96.0 mmol/L ()  L  03/04/19  05:45    


 


Carbon Dioxide  30 mmol/L (22-30)   03/04/19  05:45    


 


Anion Gap  15 mmol/L  03/04/19  05:45    


 


BUN  13 mg/dL (9-20)   03/04/19  05:45    


 


Creatinine  0.8 mg/dL (0.8-1.5)   03/04/19  05:45    


 


Estimated GFR  > 60 ml/min  03/04/19  05:45    


 


BUN/Creatinine Ratio  16 %  03/04/19  05:45    


 


Glucose  113 mg/dL ()  H  03/04/19  05:45    


 


POC Glucose  110  ()  H  03/01/19  13:16    


 


Lactic Acid  1.50 mmol/L (0.7-2.0)   02/21/19  22:57    


 


Calcium  8.7 mg/dL (8.4-10.2)   03/04/19  05:45    


 


Magnesium  2.70 mg/dL (1.7-2.3)  H  02/21/19  12:02    


 


Total Bilirubin  0.50 mg/dL (0.1-1.2)   02/22/19  05:00    


 


AST  8 units/L (5-40)   02/22/19  05:00    


 


ALT  < 5 units/L (7-56)  L  02/22/19  05:00    


 


Alkaline Phosphatase  45 units/L ()   02/22/19  05:00    


 


Ammonia  78.0 umol/L (25-60)  H  02/21/19  12:02    


 


Total Creatine Kinase  18 units/L ()  L  02/21/19  12:02    


 


Troponin T  < 0.010 ng/mL (0.00-0.029)   02/21/19  12:02    


 


Total Protein  6.5 g/dL (6.3-8.2)  D 02/22/19  05:00    


 


Albumin  3.1 g/dL (3.9-5)  L  02/22/19  05:00    


 


Albumin/Globulin Ratio  0.9 %  02/22/19  05:00    


 


TSH  0.966 mlU/mL (0.270-4.200)   02/21/19  12:02    


 


Free T4  1.35 ng/dL (0.76-1.46)   02/21/19  12:02    


 


Urine Color  Yellow  (Yellow)   03/03/19  13:52    


 


Urine Turbidity  Cloudy  (Clear)   03/03/19  13:52    


 


Urine pH  5.0  (5.0-7.0)   03/03/19  13:52    


 


Ur Specific Gravity  1.018  (1.003-1.030)   03/03/19  13:52    


 


Urine Protein  100 mg/dl mg/dL (Negative)   03/03/19  13:52    


 


Urine Glucose (UA)  50 mg/dL (Negative)   03/03/19  13:52    


 


Urine Ketones  Neg mg/dL (Negative)   03/03/19  13:52    


 


Urine Blood  Lg  (Negative)   03/03/19  13:52    


 


Urine Nitrite  Neg  (Negative)   03/03/19  13:52    


 


Urine Bilirubin  Neg  (Negative)   03/03/19  13:52    


 


Urine Urobilinogen  < 2.0 mg/dL (<2.0)   03/03/19  13:52    


 


Ur Leukocyte Esterase  Lg  (Negative)   03/03/19  13:52    


 


Urine WBC (Auto)  > 182.0 /HPF (0.0-6.0)  H  03/03/19  13:52    


 


Urine RBC (Auto)  > 182.0 /HPF (0.0-6.0)   03/03/19  13:52    


 


U Epithel Cells (Auto)  1.0 /HPF (0-13.0)   03/03/19  13:52    


 


Urine Bacteria (Auto)  2+ /HPF (Negative)   02/21/19  12:46    


 


Urine WBC Clumps  1+ /HPF  03/03/19  13:52    


 


Urine Mucus  1+ /HPF  03/03/19  13:52    


 


Salicylates  < 0.3 mg/dL (2.8-20.0)  L  02/21/19  12:02    


 


Urine Opiates Screen  Presumptive negative   02/21/19  12:46    


 


Urine Methadone Screen  Presumptive negative   02/21/19  12:46    


 


Acetaminophen  < 5.0 ug/mL (10.0-30.0)  L  02/21/19  12:02    


 


Ur Barbiturates Screen  Presumptive negative   02/21/19  12:46    


 


Ur Phencyclidine Scrn  Presumptive negative   02/21/19  12:46    


 


Ur Amphetamines Screen  Presumptive negative   02/21/19  12:46    


 


U Benzodiazepines Scrn  Presumptive negative   02/21/19  12:46    


 


Urine Cocaine Screen  Presumptive negative   02/21/19  12:46    


 


U Marijuana (THC) Screen  Presumptive negative   02/21/19  12:46    


 


Drugs of Abuse Note  Disclamer   02/21/19  12:46    


 


Plasma/Serum Alcohol  < 0.01 % (0-0.07)   02/21/19  12:02    


 


HIV 1&2 Antibody Rapid  Non react  (Non React)   02/21/19  12:02    


 


HIV P24 Antigen  Non react  (Non React)   02/21/19  12:02    














Nutrition/Malnutrition Assess





- Dietary Evaluation


Nutrition/Malnutrition Findings: 


                                        





Nutrition Notes                                            Start:  02/27/19 

13:13


Freq:                                                      Status: Active       




Protocol:                                                                       




 Document     03/01/19 11:56  SA  (Rec: 03/01/19 12:22  SA  [id number])


 Co-Sign      03/01/19 11:56  LP


 Nutrition Notes


     Initial or Follow up                        Reassessment


     Current Diagnosis                           Acute Kidney Injury


                                                 COPD


                                                 Sepsis


     Other Pertinent Diagnosis                   ETOH dependence, GI bleed, UTI


                                                 , encephalopathy


     Current Diet                                GI soft


     Labs/Tests                                  Reviewed


     Pertinent Medications                       Reviewed


     Height                                      5 ft 8 in


     Weight                                      52.6 kg


     Ideal Body Weight (kg)                      70.00


     BMI                                         17.6


     Subjective/Other Information                Patient states appetite is


                                                 good. Pt reports eating 100%


                                                 of meals and drinking 100% of


                                                 ONS. Pt denies swallowing/


                                                 chewing difficulty. Patient


                                                 states UBW being 125-135#.


                                                 Patient denies N/V/D.


     Percent of energy/protein needs met:        100%/100%


     Burn                                        Absent


     Trauma                                      Absent


     #1


      Nutrition Diagnosis                        Malnutrition


      Diagnosis Progress(for reassessment        Continues


       documentation)                            


     Is patient on ventilator?                   No


     Is Patient Ambulatory and/or Out of Bed     Yes


     REE-([place] or-ambulatory/OOB) [     1749.150


      [email or web address]]                               


     Kcal/Kg value to use for calculation        39


     Approximate Energy Requirements Using       2051


      kcal/Kg                                    


     Calculation Used for Recommendations        Kcal/kg


     Additional Notes                            Protein Needs: 63-79g (1.2-1.


                                                 5g/kg)


                                                 Fluid Needs: 1 ml/kcal


 Nutrition Intervention


     Change Diet Order:                          Continue current


     Add Supplement/Snack (indicate name/kcal    Ensure Enlive 1 daily


      /protein )                                 


     Provides kCal:                              350


     Provides Protein (gm)                       20


     Goal #1                                     Meet at least 75% of calorie


                                                 and protein needs via PO and


                                                 ONS intakes


     Goal #2                                     Wt gain/maintenance


     Anticipated Discharge Needs:                unable to determine at this


                                                 time


     Follow-Up By:                               03/05/19


     Additional Comments                         F/U: PO and ONS intakes

## 2019-03-06 NOTE — PROGRESS NOTE
Assessment and Plan


Assessment and plan: 


Patient is a 51 yo man with PMH of ETOH dependence, COPD, and malnutrition who 

presented to ED with AMS and was found to have encephalopathy, sepsis 2/2 UTI, 

and acute renal failure upon admission. There were also reports of rectal 

bleeding per family, GI evaluated. Patient on alcohol withdrawal protocol, 

restraints, no plan for endoscopy now. 





* Renal Ultrasound IMPRESSION: 1. Appearance of marked increased thickness of 

  the urinary bladder wall. Balloon catheter within the urinary bladder. 2. 

  Unremarkable appearance of the kidneys. 3. Gallstones within the gallbladder. 

  There is no demonstration of hydronephrosis, renal calculi or renal mass of 

  either kidney. 





/ EtOH dependence with intoxication, now in withdrawal: treat with Ciwa, did 

poorly with PT, has h/o heavy alcohol drinking, cont Thiamine, Folic acid, 

multivitamin, CIWA protocol with librium and ativan as needed.


/Sepsis due to UTI with suspected obstruction/Urinary retention: Sepsis 

protocol, cont IV antibiotic therapy, monitor uop q shift, IVF resuscitation 

therapy, follow Cx, renal u/s reviewed, no hydronephrosis


/GI bleed, resolved: GI consulted in ED, cont ppi therapy, stable h/h,  

supportive care. No plan for endoscopy


/Hematuria with Urinary Obstruction/Retention, PVR over 900 on 2/26/19, monae 

placed, now gross hematuria: Urology note reviewed, 


/Acute toxic Encephalopathy, likely from alcohol withdrawal, CT Head no acute 

finding, cont neuro check, seizure precautions, CIWA protocol, aspiration 

precautions, treat sepsis. Consult psych


/ARF (acute renal failure) with vasomotor nephropathy: cont IVF resuscitation, 

monitor uop q shift, repeat bmp in the am, renal u/s reviewed


/UTI (urinary tract infection) complicated/complex: cont IV antibiotic therapy, 

supportive care, urine Cx


/Severe malnutrition most likely, bmi 17.5 poa: Dietician consult


/DVT prophylaxis: SCD to BLE while in bed





During hospitalization I spoke with sister Jax [phone] who was visiting


Disposition: continue inpatient care, d/c once mental status improves/ETOH WD 

resolves. Continued restraints





Patient still confused, CIWA score pending, d/w nurse


EEG per Dr. Michael==>  EEG suggestive of Wernicke Korsakoff syndrome








History


Interval history: 


Patient was seen and examined. Follow-up on current diagnosis of AMS, still 

present. Overnight uneventful. Patient is confused. Imaging, nursing note, 

chart, labs and old chart reviewed. Discussed with patient. 





Hospitalist Physical





- Physical exam


Narrative exam: 


Gen: thin frail, NAD, Awake, Alert but confused


HEENT: NCAT, EOMI, PERRL, OP Clear 


Neck: supple, no adenopathy, no thyromegaly, no JVD 


CVS/Heart: Regular tachycardia, normal S1S2, pulses present bilaterally 


Chest/Lungs: CTA B, Symmetrical chest expansion, good air entry bilaterally 


GI/Abdomen: soft, NTND, good bowel sounds, no guarding or rebound 


/Bladder: no suprapubic tenderness, no CVA or paraspinal tenderness 


Extermity/Skin: no c/c/e, no obvious rash 


MSK: FROM x 4 


Neuro: CN 2-12 grossly intact, doesn't follow commands


Psych: confused, ignores instructions 





- Constitutional


Vitals: 


                                        











Temp Pulse Resp BP Pulse Ox


 


 97.8 F   77   16   130/84   93 


 


 03/06/19 06:17  03/06/19 06:17  03/06/19 06:17  03/06/19 06:17  03/06/19 06:17











General appearance: Present: no acute distress





Results





- Labs


CBC & Chem 7: 


                                 03/04/19 05:45





                                 03/04/19 05:45


Labs: 


                             Laboratory Last Values











WBC  6.7 K/mm3 (4.5-11.0)   03/04/19  05:45    


 


RBC  3.50 M/mm3 (3.65-5.03)  L  03/04/19  05:45    


 


Hgb  10.9 gm/dl (11.8-15.2)  L  03/04/19  05:45    


 


Hct  31.8 % (35.5-45.6)  L  03/04/19  05:45    


 


MCV  91 fl (84-94)   03/04/19  05:45    


 


MCH  31 pg (28-32)   03/04/19  05:45    


 


MCHC  34 % (32-34)   03/04/19  05:45    


 


RDW  13.7 % (13.2-15.2)   03/04/19  05:45    


 


Plt Count  357 K/mm3 (140-440)   03/04/19  05:45    


 


Lymph % (Auto)  18.6 % (13.4-35.0)   02/23/19  05:19    


 


Mono % (Auto)  14.3 % (0.0-7.3)  H  02/23/19  05:19    


 


Eos % (Auto)  0.8 % (0.0-4.3)   02/23/19  05:19    


 


Baso % (Auto)  0.5 % (0.0-1.8)   02/23/19  05:19    


 


Lymph #  1.3 K/mm3 (1.2-5.4)   02/23/19  05:19    


 


Mono #  1.0 K/mm3 (0.0-0.8)  H  02/23/19  05:19    


 


Eos #  0.1 K/mm3 (0.0-0.4)   02/23/19  05:19    


 


Baso #  0.0 K/mm3 (0.0-0.1)   02/23/19  05:19    


 


Seg Neutrophils %  65.8 % (40.0-70.0)   02/23/19  05:19    


 


Seg Neutrophils #  4.7 K/mm3 (1.8-7.7)   02/23/19  05:19    


 


PT  13.7 Sec. (12.2-14.9)   02/21/19  12:02    


 


INR  0.99  (0.87-1.13)   02/21/19  12:02    


 


APTT  27.9 Sec. (24.2-36.6)   02/21/19  12:02    


 


VBG pH  7.392  (7.320-7.420)   02/21/19  12:02    


 


Sodium  137 mmol/L (137-145)   03/04/19  05:45    


 


Potassium  4.1 mmol/L (3.6-5.0)   03/04/19  05:45    


 


Chloride  96.0 mmol/L ()  L  03/04/19  05:45    


 


Carbon Dioxide  30 mmol/L (22-30)   03/04/19  05:45    


 


Anion Gap  15 mmol/L  03/04/19  05:45    


 


BUN  13 mg/dL (9-20)   03/04/19  05:45    


 


Creatinine  0.8 mg/dL (0.8-1.5)   03/04/19  05:45    


 


Estimated GFR  > 60 ml/min  03/04/19  05:45    


 


BUN/Creatinine Ratio  16 %  03/04/19  05:45    


 


Glucose  113 mg/dL ()  H  03/04/19  05:45    


 


POC Glucose  110  ()  H  03/01/19  13:16    


 


Lactic Acid  1.50 mmol/L (0.7-2.0)   02/21/19  22:57    


 


Calcium  8.7 mg/dL (8.4-10.2)   03/04/19  05:45    


 


Magnesium  2.70 mg/dL (1.7-2.3)  H  02/21/19  12:02    


 


Total Bilirubin  0.50 mg/dL (0.1-1.2)   02/22/19  05:00    


 


AST  8 units/L (5-40)   02/22/19  05:00    


 


ALT  < 5 units/L (7-56)  L  02/22/19  05:00    


 


Alkaline Phosphatase  45 units/L ()   02/22/19  05:00    


 


Ammonia  78.0 umol/L (25-60)  H  02/21/19  12:02    


 


Total Creatine Kinase  18 units/L ()  L  02/21/19  12:02    


 


Troponin T  < 0.010 ng/mL (0.00-0.029)   02/21/19  12:02    


 


Total Protein  6.5 g/dL (6.3-8.2)  D 02/22/19  05:00    


 


Albumin  3.1 g/dL (3.9-5)  L  02/22/19  05:00    


 


Albumin/Globulin Ratio  0.9 %  02/22/19  05:00    


 


TSH  0.966 mlU/mL (0.270-4.200)   02/21/19  12:02    


 


Free T4  1.35 ng/dL (0.76-1.46)   02/21/19  12:02    


 


Urine Color  Yellow  (Yellow)   03/03/19  13:52    


 


Urine Turbidity  Cloudy  (Clear)   03/03/19  13:52    


 


Urine pH  5.0  (5.0-7.0)   03/03/19  13:52    


 


Ur Specific Gravity  1.018  (1.003-1.030)   03/03/19  13:52    


 


Urine Protein  100 mg/dl mg/dL (Negative)   03/03/19  13:52    


 


Urine Glucose (UA)  50 mg/dL (Negative)   03/03/19  13:52    


 


Urine Ketones  Neg mg/dL (Negative)   03/03/19  13:52    


 


Urine Blood  Lg  (Negative)   03/03/19  13:52    


 


Urine Nitrite  Neg  (Negative)   03/03/19  13:52    


 


Urine Bilirubin  Neg  (Negative)   03/03/19  13:52    


 


Urine Urobilinogen  < 2.0 mg/dL (<2.0)   03/03/19  13:52    


 


Ur Leukocyte Esterase  Lg  (Negative)   03/03/19  13:52    


 


Urine WBC (Auto)  > 182.0 /HPF (0.0-6.0)  H  03/03/19  13:52    


 


Urine RBC (Auto)  > 182.0 /HPF (0.0-6.0)   03/03/19  13:52    


 


U Epithel Cells (Auto)  1.0 /HPF (0-13.0)   03/03/19  13:52    


 


Urine Bacteria (Auto)  2+ /HPF (Negative)   02/21/19  12:46    


 


Urine WBC Clumps  1+ /HPF  03/03/19  13:52    


 


Urine Mucus  1+ /HPF  03/03/19  13:52    


 


Salicylates  < 0.3 mg/dL (2.8-20.0)  L  02/21/19  12:02    


 


Urine Opiates Screen  Presumptive negative   02/21/19  12:46    


 


Urine Methadone Screen  Presumptive negative   02/21/19  12:46    


 


Acetaminophen  < 5.0 ug/mL (10.0-30.0)  L  02/21/19  12:02    


 


Ur Barbiturates Screen  Presumptive negative   02/21/19  12:46    


 


Ur Phencyclidine Scrn  Presumptive negative   02/21/19  12:46    


 


Ur Amphetamines Screen  Presumptive negative   02/21/19  12:46    


 


U Benzodiazepines Scrn  Presumptive negative   02/21/19  12:46    


 


Urine Cocaine Screen  Presumptive negative   02/21/19  12:46    


 


U Marijuana (THC) Screen  Presumptive negative   02/21/19  12:46    


 


Drugs of Abuse Note  Disclamer   02/21/19  12:46    


 


Plasma/Serum Alcohol  < 0.01 % (0-0.07)   02/21/19  12:02    


 


HIV 1&2 Antibody Rapid  Non react  (Non React)   02/21/19  12:02    


 


HIV P24 Antigen  Non react  (Non React)   02/21/19  12:02    














Nutrition/Malnutrition Assess





- Dietary Evaluation


Nutrition/Malnutrition Findings: 


                                        





Nutrition Notes                                            Start:  02/27/19 

13:13


Freq:                                                      Status: Active       




Protocol:                                                                       




 Document     03/05/19 14:41  RM  (Rec: 03/05/19 14:52  RM  [id number])


 Nutrition Notes


     Initial or Follow up                        Reassessment


     Current Diagnosis                           Acute Kidney Injury,COPD,


                                                 Sepsis


     Other Pertinent Diagnosis                   ETOH dependence, GI bleed, UTI


                                                 , encephalopathy


     Current Diet                                GI soft


     Labs/Tests                                  Reviewed


     Pertinent Medications                       Reviewed


     Height                                      5 ft 8 in


     Weight                                      52.3 kg


     Ideal Body Weight (kg)                      70.00


     BMI                                         17.5


     Subjective/Other Information                Pt and pt sitter in room a


                                                 time of visit. Pt in


                                                 restraints and confused. Noted


                                                 lunch at bedside w/25% eaten


                                                 and a mostly drunk Ensure


                                                 Enlive. Recorded PO intake 64%


                                                 X 3 days.


     Percent of energy/protein needs met:        78%/100%


     Burn                                        Absent


     Trauma                                      Absent


     #1


      Nutrition Diagnosis                        Malnutrition


      Diagnosis Progress(for reassessment        Continues


       documentation)                            


     Is patient on ventilator?                   No


     Is Patient Ambulatory and/or Out of Bed     Yes


     REE-([facility]-ambulatory/OOB) [     1745.250


      [email or web address]]                               


     Kcal/Kg value to use for calculation        39


     Approximate Energy Requirements Using       2040


      kcal/Kg                                    


     Calculation Used for Recommendations        Kcal/kg


     Additional Notes                            Protein Needs: 63-79g (1.2-1.


                                                 5g/kg)


                                                 Fluid Needs: 1 ml/kcal


 Nutrition Intervention


     Change Diet Order:                          Advance when medically able


     Add Supplement/Snack (indicate name/kcal    Ensure Enlive 1 daily


      /protein )                                 


     Provides kCal:                              350


     Provides Protein (gm)                       20


     Goal #1                                     Continue to meet at least 75%


                                                 of calorie and protein needs


                                                 via PO and ONS intakes


     Goal #2                                     Wt gain/maintenance


     Anticipated Discharge Needs:                unable to determine at this


                                                 time


     Follow-Up By:                               03/12/19


     Additional Comments                         Follow for PO and ONS intakes

## 2019-03-07 NOTE — PROGRESS NOTE
Assessment and Plan


Assessment and Plan


Patient is a 51 yo man with PMH of ETOH dependence, COPD, and malnutrition who 

presented to ED with AMS and was found to have encephalopathy, sepsis 2/2 UTI, 

and acute renal failure upon admission. There were also reports of rectal 

bleeding per family, GI evaluated. Patient on alcohol withdrawal protocol, 

restraints, no plan for endoscopy now. 





* Renal Ultrasound IMPRESSION: 1. Appearance of marked increased thickness of 

  the urinary bladder wall. Balloon catheter within the urinary bladder. 2. 

  Unremarkable appearance of the kidneys. 3. Gallstones within the gallbladder. 

  There is no demonstration of hydronephrosis, renal calculi or renal mass of 

  either kidney. 





/ EtOH dependence with intoxication, now in withdrawal: treat with Ciwa, did 

poorly with PT, has h/o heavy alcohol drinking, cont Thiamine, Folic acid, 

multivitamin, CIWA protocol with librium and ativan as needed.


/Sepsis due to UTI with suspected obstruction/Urinary retention: Sepsis 

protocol, cont IV antibiotic therapy, monitor uop q shift, IVF resuscitation 

therapy, follow Cx, renal u/s reviewed, no hydronephrosis


/GI bleed, resolved: GI consulted in ED, cont ppi therapy, stable h/h,  

supportive care. No plan for endoscopy


/Hematuria with Urinary Obstruction/Retention, PVR over 900 on 2/26/19, monae 

placed, now gross hematuria: Urology note reviewed, 


/Acute toxic Encephalopathy, likely from alcohol withdrawal, CT Head no acute 

finding, cont neuro check, seizure precautions, CIWA protocol, aspiration 

precautions, treat sepsis. Consult psych


/ARF (acute renal failure) with vasomotor nephropathy: cont IVF resuscitation, 

monitor uop q shift, repeat bmp in the am, renal u/s reviewed


/UTI (urinary tract infection) complicated/complex: cont IV antibiotic therapy, 

supportive care, urine Cx


/Severe malnutrition most likely, bmi 17.5 poa: Dietician consult


/DVT prophylaxis: SCD to BLE while in bed





During hospitalization I spoke with sister Jax [phone] who was visiting


Mental status improved/ETOH WD resolves.


No wernicke's 


D.c Monae today


If  no urinary retention discharge tomorrow











Subjective


Date of service: 03/07/19


Principal diagnosis: hematochezia


Interval history: 


Alert and oriented








Objective





- Constitutional


Vitals: 


                               Vital Signs - 12hr











  03/07/19





  11:25


 


Temperature 98.0 F


 


Pulse Rate 71


 


Respiratory 18





Rate 


 


Blood Pressure 95/56





[Right] 


 


O2 Sat by Pulse 93





Oximetry 











General appearance: Present: no acute distress, well-nourished





- EENT


Eyes: PERRL, EOM intact


ENT: hearing intact, clear oral mucosa


Ears: bilateral: normal





- Neck


Neck: supple, normal ROM





- Respiratory


Respiratory effort: normal


Respiratory: bilateral: CTA





- Breasts


Breasts: normal





- Cardiovascular


Rhythm: regular


Heart Sounds: Present: S1 & S2.  Absent: gallop, rub


Extremities: pulses intact, No edema, normal color, Full ROM





- Gastrointestinal


General gastrointestinal: Present: soft, non-tender, non-distended, normal bowel

 sounds





- Genitourinary


Male genitourinary: normal





- Integumentary


Integumentary: clear, warm, dry





- Musculoskeletal


Musculoskeletal: 1, strength equal bilaterally





- Neurologic


Neurologic: moves all extremities





- Psychiatric


Psychiatric: memory intact, appropriate mood/affect, intact judgment & insight





- Allied health notes


Allied health notes reviewed: nursing, case management





- Labs


CBC & Chem 7: 


                                 03/04/19 05:45





                                 03/04/19 05:45

## 2019-03-08 NOTE — DISCHARGE SUMMARY
Providers





- Providers


Date of Admission: 


02/21/19 17:02





Date of discharge: 03/08/19


Attending physician: 


LISETH SIMS





                                        





02/21/19 19:14


Consult to Physician [CONS] Routine 


   Comment: 


   Consulting Provider: NATE DEXTER


   Physician Instructions: 


   Reason For Exam: GI bleeding





02/25/19 14:42


Physical Therapy Evaluation and Treat [CONS] Routine 


   Comment: 


   Reason For Exam: placement





02/25/19 16:59


Speech Therapy Evaluation and Treat [CONS] Routine 


   Reason For Exam: aspiration ??





02/27/19 14:34


Consult to Physician [CONS] Routine 


   Comment: 


   Consulting Provider: BLANCA BROWN


   Physician Instructions: I notified


   Reason For Exam: gross hematuria and urinary retention





03/03/19 09:38


Consult to Physician [CONS] Routine 


   Comment: 


   Consulting Provider: SUMA SIGALA


   Physician Instructions: 


   Reason For Exam: Altered mental status,











Primary care physician: 


PRIMARY CARE MD








Hospitalization


Condition: Fair


Hospital course: 


Assessment and Plan


Patient is a 53 yo man with PMH of ETOH dependence, COPD, and malnutrition who 

presented to ED with AMS and was found to have encephalopathy, sepsis 2/2 UTI, 

and acute renal failure upon admission. There were also reports of rectal 

bleeding per family, GI evaluated. Patient on alcohol withdrawal protocol, 

restraints, no plan for endoscopy now. 





* Renal Ultrasound IMPRESSION: 1. Appearance of marked increased thickness of 

  the urinary bladder wall. Balloon catheter within the urinary bladder. 2. 

  Unremarkable appearance of the kidneys. 3. Gallstones within the gallbladder. 

  There is no demonstration of hydronephrosis, renal calculi or renal mass of 

  either kidney. 





EtOH dependence with intoxication, in wothdrawal --treated and now stable


Sepsis due to UTI with suspected obstruction/Urinary retention: Sepsis resolved


GI bleed, resolved: GI consulted in ED, cont ppi therapy, stable h/h,  

supportive care. No plan for endoscopy


Hematuria with Urinary Obstruction/Retention, PVR over 900 on 2/26/19, monae 

placed, now gross hematuria: Urology note reviewed, 


Acute toxic Encephalopathy, likely from alcohol withdrawal, CT Head no acute 

finding, cont neuro check, seizure precautions, CIWA protocol, aspiration 

precautions, treat sepsis. Consult psych


ARF (acute renal failure) with vasomotor nephropathy: cont IVF resuscitation, 

monitor uop q shift, repeat bmp in the am, renal u/s reviewed


UTI (urinary tract infection) complicated/complex:  antibiotic therapy completed


Severe malnutrition most likely, bmi 17.5 poa: Dietician consult








During hospitalization I spoke with sister Jax [phone] who was visiting


Mental status improved/ETOH WD resolves.


No wernicke's 


[email or web address] Monae yesterday


COntinues to have urinary retention


Patient to be discharged on Monae with leg bag and Flomax to improve urinary 

retention


Also follow with Urology Dr Adams as out patient.





Disposition: DC-01 TO HOME OR SELFCARE





Core Measure Documentation





- Palliative Care


Palliative Care/ Comfort Measures: Not Applicable





- Core Measures


Any of the following diagnoses?: none





Exam





- Constitutional


Vitals: 


                                        











Temp Pulse Resp BP Pulse Ox


 


 97.8 F   81   16   95/65   95 


 


 03/08/19 05:02  03/08/19 05:02  03/08/19 05:02  03/08/19 05:02  03/08/19 05:02











General appearance: Present: no acute distress, well-nourished





- EENT


Eyes: Present: PERRL


ENT: hearing intact, clear oral mucosa





- Neck


Neck: Present: supple, normal ROM





- Respiratory


Respiratory effort: normal


Respiratory: bilateral: CTA





- Cardiovascular


Heart rate: 78


Rhythm: regular


Heart Sounds: Present: S1 & S2.  Absent: rub, click





- Extremities


Extremities: no ischemia, pulses intact, pulses symmetrical, No edema


Peripheral Pulses: within normal limits





- Abdominal


General gastrointestinal: Present: soft, non-tender, non-distended, normal bowel

 sounds


Male genitourinary: Present: normal





- Integumentary


Integumentary: Present: clear, warm, dry





- Musculoskeletal


Musculoskeletal: gait normal, strength equal bilaterally





- Psychiatric


Psychiatric: appropriate mood/affect, intact judgment & insight





- Neurologic


Neurologic: CNII-XII intact, moves all extremities





- Allied Health


Allied health notes reviewed: nursing, case management





Plan


Activity: no restrictions


Diet: low fat, low cholesterol, low salt


Follow up with: 


PRIMARY CARE,MD [Primary Care Provider] - 3-5 Days

## 2019-03-14 NOTE — EMERGENCY DEPARTMENT REPORT
ED General Adult HPI





- General


Chief complaint: Medical Clearance


Stated complaint: REMOVAL OF CATH


Time Seen by Provider: 03/14/19 16:09


Source: patient


Mode of arrival: Ambulatory


Limitations: No Limitations





- History of Present Illness


Initial comments: 





This is a 54-year-old male nontoxic, well nourished in appearance, no acute 

signs of distress presents to the ED with c/o of monae catheter removal.  

Patient stated was admitted for urosepsis and was instructed to follow-up with a

urologist.  Patient stated that he went to a urologist which requested him to 

pay $250 co-pay per patient stated he does not have it so urologist did not 

remove the Monae catheter.  Patient denies any urinary symptoms.  Denies any 

penile discharge.    Patient denies any testicular pain or swelling. Patient 

denies any penile ulcers or lesions.  Patient denies any nausea, vomiting, chest

pain, shortness of breathe, fever, chills, headache, back pain, numbness, ting

ling, stiff neck.  Patient denies any back pain.  Patient denies any urinary 

symptoms.  Patient denies any allergies.


Severity scale (0 -10): 0


Improves with: none


Worsens with: none


Associated Symptoms: denies other symptoms.  denies: confusion, chest pain, 

cough, diaphoresis, fever/chills, headaches, loss of appetite, malaise, 

nausea/vomiting, rash, seizure, shortness of breath, syncope, weakness





- Related Data


                                  Previous Rx's











 Medication  Instructions  Recorded  Last Taken  Type


 


Famotidine [Acid Controller] 20 mg PO BID #60 tablet 03/08/19 Unknown Rx


 


Polyethylene Glycol 3350 [Miralax 17 gm PO QDAY #527 powd.pack 03/08/19 Unknown 

Rx





3350]    


 


Tamsulosin [Flomax] 0.4 mg PO QDAY #30 capsule 03/08/19 Unknown Rx


 


chlordiazePOXIDE [Librium] 25 mg PO Q8H #60 capsule 03/08/19 Unknown Rx


 


oxyCODONE /ACETAMINOPHEN [Percocet 1 tab PO Q8H PRN #15 tablet 03/08/19 Unknown 

Rx





5/325 mg]    


 


Ciprofloxacin HCl [Ciprofloxacin 500 mg PO Q12HR #20 tab 03/14/19 Unknown Rx





TAB]    











                                    Allergies











Allergy/AdvReac Type Severity Reaction Status Date / Time


 


No Known Allergies Allergy   Verified 03/14/19 13:53














ED Review of Systems


ROS: 


Stated complaint: REMOVAL OF CATH


Other details as noted in HPI





Constitutional: denies: chills, fever


Eyes: denies: eye pain, eye discharge, vision change


ENT: denies: ear pain, throat pain


Respiratory: denies: cough, shortness of breath, wheezing


Cardiovascular: denies: chest pain, palpitations


Endocrine: no symptoms reported


Gastrointestinal: denies: abdominal pain, nausea, diarrhea


Genitourinary: denies: urgency, dysuria


Musculoskeletal: denies: back pain, joint swelling, arthralgia


Skin: denies: rash, lesions


Neurological: denies: headache, weakness, paresthesias


Psychiatric: denies: anxiety, depression


Hematological/Lymphatic: denies: easy bleeding, easy bruising





ED Past Medical Hx





- Past Medical History


Previous Medical History?: Yes


Hx Liver Disease: No


Hx Arthritis: No


Hx COPD: Yes





- Surgical History


Past Surgical History?: No





- Social History


Smoking Status: Unknown if ever smoked


Substance Use Type: None





- Medications


Home Medications: 


                                Home Medications











 Medication  Instructions  Recorded  Confirmed  Last Taken  Type


 


Famotidine [Acid Controller] 20 mg PO BID #60 tablet 03/08/19  Unknown Rx


 


Polyethylene Glycol 3350 [Miralax 17 gm PO QDAY #527 powd.pack 03/08/19  Unknown

 Rx





3350]     


 


Tamsulosin [Flomax] 0.4 mg PO QDAY #30 capsule 03/08/19  Unknown Rx


 


chlordiazePOXIDE [Librium] 25 mg PO Q8H #60 capsule 03/08/19  Unknown Rx


 


oxyCODONE /ACETAMINOPHEN [Percocet 1 tab PO Q8H PRN #15 tablet 03/08/19  Unknown

 Rx





5/325 mg]     


 


Ciprofloxacin HCl [Ciprofloxacin 500 mg PO Q12HR #20 tab 03/14/19  Unknown Rx





TAB]     














ED Physical Exam





- General


Limitations: No Limitations


General appearance: alert, in no apparent distress





- Head


Head exam: Present: atraumatic, normocephalic





- Neck


Neck exam: Present: normal inspection, full ROM





- 


 exam: Present: normal inspection.  Absent: testicular tenderness, urethral 

discharge, scrotal swelling, vertical testicular lie


External exam: Present: normal external exam.  Absent: erythema, swelling, 

lesions, lacerations, ecchymosis, bleeding





- Extremities Exam


Extremities exam: Present: normal inspection, full ROM, normal capillary refill.

 Absent: tenderness





- Back Exam


Back exam: Present: normal inspection, full ROM.  Absent: tenderness, CVA 

tenderness (R), CVA tenderness (L), muscle spasm, paraspinal tenderness, 

vertebral tenderness, rash noted





- Neurological Exam


Neurological exam: Present: alert, oriented X3





- Psychiatric


Psychiatric exam: Present: normal affect, normal mood





- Skin


Skin exam: Present: warm, dry, intact, normal color.  Absent: rash





ED Course





                                   Vital Signs











  03/14/19





  16:15


 


Temperature 97.4 F L


 


Pulse Rate 78


 


Respiratory 20





Rate 


 


Blood Pressure 92/56


 


O2 Sat by Pulse 97





Oximetry 














- Reevaluation(s)


Reevaluation #1: 





03/14/19 18:10


Patient is speaking in full sentences with no signs of distress noted.





ED Medical Decision Making





- Medical Decision Making





The monae catheter has been removed and patient tolerated well.  I did discuss 

with Dr. Merida about this and was instructed that patient may develop urosepsis

is left Monae catheter prolonged period.  Patient was educated and instructed 

that if patient has any urinary symptoms or has urinary retention to return to 

emergency room as soon as possible.  I will discharge patient with ciprofloxacin

empirically.  Patient was referred to Follow-up with a primary care/urologist 

doctor in 3-5 days or if symptoms worsen and continue return to emergency room 

as soon as possible.  At time of discharge, the patient does not seem toxic or 

ill in appearance.  No acute signs of distress noted.  Patient agrees to 

discharge treatment plan of care.  No further questions noted by the patient.


Critical care attestation.: 


If time is entered above; I have spent that time in minutes in the direct care 

of this critically ill patient, excluding procedure time.








ED Disposition


Clinical Impression: 


 Encounter for Monae catheter removal





Disposition: DC-01 TO HOME OR SELFCARE


Is pt being admited?: No


Does the pt Need Aspirin: No


Condition: Stable


Additional Instructions: 


Follow-up with a primary care/urologist doctor in 3-5 days or if symptoms worsen

and continue return to emergency room as soon as possible. 


As instructed and mentioned to you that if you develop any urinary symptoms or 

urinary retention with extremity syndrome as soon as possible.


Prescriptions: 


Ciprofloxacin HCl [Ciprofloxacin TAB] 500 mg PO Q12HR #20 tab


Referrals: 


[facility] [Primary Care Provider] - 3-5 Days


[facility] [Referring] - 3-5 Days


NENA JAIME MD [Staff Physician] - 3-5 Days


[facility] [Outside] - 3-5 Days


[facility] [Outside] - 3-5 Days

## 2019-03-14 NOTE — EMERGENCY DEPARTMENT REPORT
Chief Complaint: Medical Clearance


Stated Complaint: REMOVAL OF CATH


Time Seen by Provider: 03/14/19 16:09





- HPI


History of Present Illness: 





pt presents to the ED for monae catheter removal


pt was admitted for urosepsis


present to the doctors office and would not remove catheter without $250 copay 





MSE screening note: 


Focused history and physical exam performed.


Due to findings the following was ordered: UA











ED Disposition for MSE


Condition: Stable


Referrals: 


[facility] [Primary Care Provider] - 3-5 Days

## 2019-03-15 NOTE — EMERGENCY DEPARTMENT REPORT
ED Male  HPI





- General


Chief complaint: Urogenital-Male


Stated complaint: CAMPA COMPLICATIONS


Time Seen by Provider: 03/15/19 13:00


Source: patient


Mode of arrival: Ambulatory


Limitations: No Limitations





- History of Present Illness


Initial comments: 





Patient is a 54-year-old male that presents emergency room with complaints of 

urinary retention.  Patient states he was here yesterday and had his Campa 

removed.  Patient urinated prior to discharge blood has not urinated since.  He 

can also diagnosed with a UTI but has not started his antibiotics yet  Patient 

has not seen a urologist yet.  Patient denies pain except for bladder pain and 

bladder fullness.  Patient states his discomfort is at a 5 out of 10.  Patient 

states the pain is nonradiating.  Patient denies fever and chills.  Patient has 

not urinated at all.


MD Complaint: other (urinary retention)


-: Gradual


Location: abdomen


Radiation: none


Severity: moderate


Severity scale (0 -10): 5


Quality: aching


Consistency: constant


Improves with: rest


Worsens with: palpation, movement


indwelling catheter


urinary retention.  denies: discharge, swelling, mass, rash, blood in urine, 

dysuria, fever, nausea/vomiting, incontinence





- Related Data


                                  Previous Rx's











 Medication  Instructions  Recorded  Last Taken  Type


 


Famotidine [Acid Controller] 20 mg PO BID #60 tablet 03/08/19 Unknown Rx


 


Polyethylene Glycol 3350 [Miralax 17 gm PO QDAY #527 powd.pack 03/08/19 Unknown 

Rx





3350]    


 


Tamsulosin [Flomax] 0.4 mg PO QDAY #30 capsule 03/08/19 Unknown Rx


 


chlordiazePOXIDE [Librium] 25 mg PO Q8H #60 capsule 03/08/19 Unknown Rx


 


oxyCODONE /ACETAMINOPHEN [Percocet 1 tab PO Q8H PRN #15 tablet 03/08/19 Unknown 

Rx





5/325 mg]    


 


Ciprofloxacin HCl [Ciprofloxacin 500 mg PO Q12HR #20 tab 03/15/19 Unknown Rx





TAB]    











                                    Allergies











Allergy/AdvReac Type Severity Reaction Status Date / Time


 


No Known Allergies Allergy   Verified 03/14/19 13:53














ED Review of Systems


ROS: 


Stated complaint: CAMPA COMPLICATIONS


Other details as noted in HPI





Constitutional: denies: chills, fever


Eyes: denies: eye pain, eye discharge, vision change


ENT: denies: ear pain, throat pain


Respiratory: denies: cough, shortness of breath, wheezing


Cardiovascular: denies: chest pain, palpitations


Endocrine: no symptoms reported


Gastrointestinal: abdominal pain.  denies: nausea, diarrhea


Genitourinary: denies: urgency, dysuria


Musculoskeletal: denies: back pain, joint swelling, arthralgia


Skin: denies: rash, lesions


Neurological: denies: headache, weakness, paresthesias


Psychiatric: denies: anxiety, depression


Hematological/Lymphatic: denies: easy bleeding, easy bruising





ED Past Medical Hx





- Past Medical History


Previous Medical History?: Yes


Hx Liver Disease: No


Hx Arthritis: No


Hx COPD: Yes





- Surgical History


Past Surgical History?: No





- Family History


Family history: no significant





- Social History


Smoking Status: Unknown if ever smoked


Substance Use Type: None





- Medications


Home Medications: 


                                Home Medications











 Medication  Instructions  Recorded  Confirmed  Last Taken  Type


 


Famotidine [Acid Controller] 20 mg PO BID #60 tablet 03/08/19  Unknown Rx


 


Polyethylene Glycol 3350 [Miralax 17 gm PO QDAY #527 powd.pack 03/08/19  Unknown

 Rx





3350]     


 


Tamsulosin [Flomax] 0.4 mg PO QDAY #30 capsule 03/08/19  Unknown Rx


 


chlordiazePOXIDE [Librium] 25 mg PO Q8H #60 capsule 03/08/19  Unknown Rx


 


oxyCODONE /ACETAMINOPHEN [Percocet 1 tab PO Q8H PRN #15 tablet 03/08/19  Unknown

 Rx





5/325 mg]     


 


Ciprofloxacin HCl [Ciprofloxacin 500 mg PO Q12HR #20 tab 03/15/19  Unknown Rx





TAB]     














ED Physical Exam





- General


Limitations: No Limitations


General appearance: alert, in no apparent distress





- Head


Head exam: Present: atraumatic, normocephalic





- Eye


Eye exam: Present: normal appearance





- ENT


ENT exam: Present: mucous membranes moist





- Neck


Neck exam: Present: normal inspection





- Respiratory


Respiratory exam: Present: normal lung sounds bilaterally.  Absent: respiratory 

distress





- Cardiovascular


Cardiovascular Exam: Present: regular rate, normal rhythm.  Absent: systolic 

murmur, diastolic murmur, rubs, gallop





- GI/Abdominal


GI/Abdominal exam: Present: soft, tenderness (suprapubic tenderness and full 

bladder noted), normal bowel sounds





- Rectal


Rectal exam: Present: deferred





- Extremities Exam


Extremities exam: Present: normal inspection





- Back Exam


Back exam: Present: normal inspection





- Neurological Exam


Neurological exam: Present: alert, oriented X3





- Psychiatric


Psychiatric exam: Present: normal affect, normal mood





- Skin


Skin exam: Present: warm, dry, intact, normal color.  Absent: rash





ED Course


                                   Vital Signs











  03/15/19





  13:59


 


Temperature 98.1 F


 


Pulse Rate 88


 


Respiratory 20





Rate 


 


Blood Pressure 112/75





[Left] 














- Reevaluation(s)


Reevaluation #1: 


Campa placement nurse and symptoms improved.  Patient states his abdominal pain 

has resolved.


03/15/19 13:23








Patient is resting comfortably.  Patient instructed to  his antibiotics 

from the pharmacy.  Patient will be discharged with a Campa in place.  She 

states that all pain has resolved.  Patient they will discharge.  Patient given 

discharge instructions.  Patient voiced understanding of discharge instructions.


03/15/19 14:21














ED Medical Decision Making





- Medical Decision Making





Patient is a 54-year-old male that presents to emergency with complaints of 

urinary retention and not urinating for multiple hours.  Patient complained of 

lower abdominal pressure.  Patient pressure and symptoms were relieved with a 

Campa.  Patient discharged with Campa home.  Patient instructed to follow up 

with urologist.  Patient also found to have a UTI.  Patient also did not start 

his antibiotics that were given at his previous visit.  Patient instructed to 

start antibiotics.





- Differential Diagnosis


urinary retention.  Prostatitis.  UTI.


Critical care attestation.: 


If time is entered above; I have spent that time in minutes in the direct care 

of this critically ill patient, excluding procedure time.








ED Disposition


Clinical Impression: 


 Urinary retention





UTI (urinary tract infection)


Qualifiers:


 Urinary tract infection type: acute cystitis Hematuria presence: with hematuria

 Qualified Code(s): N30.01 - Acute cystitis with hematuria





Disposition: DC-01 TO HOME OR SELFCARE


Is pt being admited?: No


Does the pt Need Aspirin: No


Condition: Stable


Instructions:  Urinary Retention in Men (ED), Urinary Tract Infection in Men 

(ED)


Additional Instructions: 


Patient to follow up with primary care in 2-3 days.  Patient to follow up with 

urologist as soon as possible.  Patient to increase water.  Patient to continue 

Campa catheter until discharge continued by urologist.  Patient take Tylenol or 

ibuprofen when necessary for pain.  The patient increase water.  Patient didn't 

take antibiotics.  Patient to start Cipro that was prescribed previously with 

his last visit here.


Prescriptions: 


Ciprofloxacin HCl [Ciprofloxacin TAB] 500 mg PO Q12HR #20 tab


Referrals: 


BLANCA BROWN MD [Staff Physician] - 2-3 Days


[facility] [Primary Care Provider] - 2-3 Days


Time of Disposition: 14:25

## 2019-04-09 NOTE — EMERGENCY DEPARTMENT REPORT
ED Recheck HPI





- General


Chief Complaint: Urogenital-Male


Stated Complaint: CATH REMOVAL


Time Seen by Provider: 04/09/19 12:31


Source: patient


Mode of arrival: Ambulatory


Limitations: No Limitations





- History of Present Illness


Initial Comments: 





Patient is a 54-year-old male who comes to the ER to have his Monae removed.  

Patient has been in and out of the ER with placement of a Monae after an 

admission for urinary retention.  That he had it removed.  He came back the next

day with urinary retention and was sent home with the Monae.  He attempted to 

get an appointment with urology but they will not see him because he has no pain

or source.  Patient states that he has taken the antibiotics that he was given. 

His urine is draining to bedside bag hanging on his routine pocket.  Urine is 

dilute and without blood.


VSS no fever, no cva tenderness, no complaints of abdominal pain


Symptoms Since Prior Visit: no new symptoms





- Related Data


                                  Previous Rx's











 Medication  Instructions  Recorded  Last Taken  Type


 


Tamsulosin HCl [Flomax] 0.4 mg PO DAILY #30 cap.er.24h 04/09/19 Unknown Rx


 


Nitrofurantoin Monohyd/M-Cryst 100 mg PO BID #13 capsule 04/10/19 Unknown Rx





[Macrobid 100 mg Capsule]    











                                    Allergies











Allergy/AdvReac Type Severity Reaction Status Date / Time


 


No Known Allergies Allergy   Verified 03/14/19 13:53














ED Review of Systems


ROS: 


Stated complaint: CATH REMOVAL


Other details as noted in HPI





Comment: All other systems reviewed and negative





ED Past Medical Hx





- Past Medical History


Hx Liver Disease: No


Hx Arthritis: No


Hx COPD: Yes


Additional medical history: urinary retention/ UTI/ etoh abuse/CARLINE





- Surgical History


Past Surgical History?: No





- Family History


Family history: no significant





- Social History


Smoking Status: Current Every Day Smoker


Substance Use Type: None





- Medications


Home Medications: 


                                Home Medications











 Medication  Instructions  Recorded  Confirmed  Last Taken  Type


 


Tamsulosin HCl [Flomax] 0.4 mg PO DAILY #30 cap.er.24h 04/09/19  Unknown Rx


 


Nitrofurantoin Monohyd/M-Cryst 100 mg PO BID #13 capsule 04/10/19  Unknown Rx





[Macrobid 100 mg Capsule]     














ED Physical Exam





- General


Limitations: No Limitations


General appearance: alert





- Head


Head exam: Present: atraumatic, normocephalic





- Eye


Eye exam: Present: normal appearance, PERRL


Pupils: Present: normal accommodation





- ENT


ENT exam: Present: mucous membranes moist





- Neck


Neck exam: Present: normal inspection





- Respiratory


Respiratory exam: Present: normal lung sounds bilaterally





- Cardiovascular


Cardiovascular Exam: Present: regular rate





- GI/Abdominal


GI/Abdominal exam: Present: soft, normal bowel sounds





- Rectal


Rectal exam: Present: deferred





- Extremities Exam


Extremities exam: Present: normal inspection, full ROM





- Back Exam


Back exam: Present: normal inspection, full ROM.  Absent: CVA tenderness (R), 

CVA tenderness (L)





- Neurological Exam


Neurological exam: Present: alert, oriented X3, CN II-XII intact, normal gait





- Psychiatric


Psychiatric exam: Present: normal affect, normal mood





- Skin


Skin exam: Present: warm, dry





ED Course


                                   Vital Signs











  04/09/19





  12:34


 


Temperature 98.1 F


 


Pulse Rate 97 H


 


Respiratory 16





Rate 


 


Blood Pressure 147/84


 


O2 Sat by Pulse 98





Oximetry 














ED Recheck MDM





- Core Measures


Measure Exclusions: not indicated





- Medical Decision Making


pt has been counseled on that if we take monae out and he can urinate he will 

have to come back to get monae replaced. 


he states I understand but I can not get follow up with urology. 


urine is clear, dilute


no fever


no abd pain 


no cva tenderness


pt states he took his antibiotic





pt well aware of implications of removal of the catheter


will dc cath 





urine sent for culture in the event pt continues to have problem we will have 

source and can chose appropriate antibiotic





pt given follow up with Dr Adams who has seen the pt here in the past and 

should see him in follow up. 








                                   Vital Signs











  04/09/19





  12:34


 


Temperature 98.1 F


 


Pulse Rate 97 H


 


Respiratory 16





Rate 


 


Blood Pressure 147/84


 


O2 Sat by Pulse 98





Oximetry 








dc home with flomax


Critical care attestation.: 


If time is entered above; I have spent that time in minutes in the direct care 

of this critically ill patient, excluding procedure time.








ED Disposition


Clinical Impression: 


 Complication of Monae catheter, Encounter for Monae catheter removal





Disposition: DC-01 TO HOME OR SELFCARE


Is pt being admited?: No


Does the pt Need Aspirin: No


Condition: Stable


Additional Instructions: 


MED AS ORDERED TODAY





DRINK A LOT OF WATER





AVOID ALCOHOL OR DRUGS





FOLLOW UP WITH DR ADAMS


TELL HIM HE SAW YOU IN HOSPITAL 


AND HAS BEEN ON CALL WHEN YOU HAVE BEEN SEEN IN ER


HE CAN ACCESS AND READ OUR NOTES


Prescriptions: 


Tamsulosin HCl [Flomax] 0.4 mg PO DAILY #30 cap.er.24h


Referrals: 


NENA ADAMS MD [Staff Physician] - 3-5 Days


Time of Disposition: 15:20

## 2019-04-09 NOTE — EMERGENCY DEPARTMENT REPORT
Chief Complaint: Urogenital-Male


Stated Complaint: CATH REMOVAL


Time Seen by Provider: 04/09/19 12:31





- HPI


History of Present Illness: 





This is a 54 y.o. male that presents to the ER to have Liriano catheter removed.  

Patient went to the urologist today and sent here because he didn't have a I.D.





Cather removed on 3/14/2019 and replaced on 3/15/19 due to urinary retention.  

Patient seen here 3/15/19 for Liriano completions.











- Exam


Vital Signs: 


                                   Vital Signs











  04/09/19





  12:34


 


Temperature 98.1 F


 


Pulse Rate 97 H


 


Respiratory 16





Rate 


 


Blood Pressure 147/84


 


O2 Sat by Pulse 98





Oximetry 











MSE screening note: 


Focused history and physical exam performed.


Due to findings the following was ordered:





[facility] or [facility] for further evaluation.





ED Disposition for MSE


Condition: Stable

## 2019-04-10 NOTE — EMERGENCY DEPARTMENT REPORT
ED Male  HPI





- General


Chief complaint: Urogenital-Male


Stated complaint: CATH REMOVAL ISSUE


Time Seen by Provider: 04/10/19 01:55


Source: patient


Mode of arrival: Ambulatory


Limitations: No Limitations





- History of Present Illness


Initial comments: 





54-year-old male with a past medical history of recurrent urinary retention, 

COPD, prostate enlargement, and EtOH abuse presents to the hospital with 

complaints of inability to urinate since It was removed 3 hours ago.  As per 

medical record review patient was here in March with a similar presentation.  

Patient has yet to follow up with urology and identification issues.  Patient 

came in yesterday requesting Monae removal and now be presents once again to the

hospital with obstruction symptoms.  Patient was prescribed a month's supply of 

Flomax on his visit yesterday.  UA shows signs of leukocytosis, increased WBC 

count and WBCs.  Patient states he was not prescribed antibiotics and finished 

his last course from last month.





- Related Data


                                  Previous Rx's











 Medication  Instructions  Recorded  Last Taken  Type


 


Tamsulosin HCl [Flomax] 0.4 mg PO DAILY #30 cap.er.24h 04/09/19 Unknown Rx


 


Nitrofurantoin Monohyd/M-Cryst 100 mg PO BID #13 capsule 04/10/19 Unknown Rx





[Macrobid 100 mg Capsule]    











                                    Allergies











Allergy/AdvReac Type Severity Reaction Status Date / Time


 


No Known Allergies Allergy   Verified 03/14/19 13:53














ED Review of Systems


ROS: 


Stated complaint: CATH REMOVAL ISSUE


Other details as noted in HPI





Comment: All other systems reviewed and negative





ED Past Medical Hx





- Past Medical History


Previous Medical History?: Yes


Hx Liver Disease: No


Hx Arthritis: No


Hx COPD: Yes


Additional medical history: urinary retention/ UTI/ etoh abuse/CARLINE





- Surgical History


Past Surgical History?: No





- Social History


Smoking Status: Current Every Day Smoker





- Medications


Home Medications: 


                                Home Medications











 Medication  Instructions  Recorded  Confirmed  Last Taken  Type


 


Tamsulosin HCl [Flomax] 0.4 mg PO DAILY #30 cap.er.24h 04/09/19  Unknown Rx


 


Nitrofurantoin Monohyd/M-Cryst 100 mg PO BID #13 capsule 04/10/19  Unknown Rx





[Macrobid 100 mg Capsule]     














ED Physical Exam





- General


Limitations: No Limitations





- Other


Other exam information: 





General: No limitations, patient is alert in no acute distress


Head exam: Atraumatic, normocephalic


Eyes exam: Normal appearance


ENT: Moist mucous membrane, normal oropharynx


Neck exam: Normal inspection, full range of motion, no meningismus nontender


Respiratory exam: Clear to auscultation bilateral, no wheezes, rales, crackles


Cardiovascular: Normal rate and rhythm, normal heart sounds


Abdomen: Soft, nondistended, mild suprapubic tenderness, with normal bowel 

sounds, no rebound, or guarding


Extremity: Full range of motion normal inspection no deformity


Back: Normal Inspection, full range of motion, no tenderness


Neurologic: Alert, oriented x3, cranial nerves intact, no motor or sensory 

deficit


Psychiatric: normal affect, normal mood


Skin: Warm, dry, intact





ED Course


                                   Vital Signs











  04/09/19 04/10/19





  23:34 01:54


 


Temperature 97.4 F L 97.5 F L


 


Pulse Rate 112 H 86


 


Respiratory 18 16





Rate  


 


Blood Pressure  141/85


 


Blood Pressure 131/89 





[Left]  


 


O2 Sat by Pulse 93 97





Oximetry  














ED Medical Decision Making





- Medical Decision Making





monae placed in ed. RN reports initial output of 120ml. Prior to d/c total 

output 900 ml


marobid po provided.





- Differential Diagnosis


bph, prostate ca, uti


Critical Care Time: No


Critical care attestation.: 


If time is entered above; I have spent that time in minutes in the direct care 

of this critically ill patient, excluding procedure time.








ED Disposition


Clinical Impression: 


 Urine retention, UTI (urinary tract infection)





Disposition: DC-09 OP ADMIT IP TO THIS HOSP


Is pt being admited?: Yes


Condition: Stable


Instructions:  Monae Catheter Placement and Care (ED), Urinary Leg Bag (GEN), 

Urinary Tract Infection in Men (ED), Urinary Retention in Men (ED)


Additional Instructions: 


Take the medication as prescribed.  Follow up with your doctor or the 

clinic/doctor provided.  Return if symptoms worsen as indicated by your 

discharge instructions


Prescriptions: 


Nitrofurantoin Monohyd/M-Cryst [Macrobid 100 mg Capsule] 100 mg PO BID #13 

capsule


Referrals: 


BLANCA BROWN MD [Staff Physician] - 3-5 Days


Time of Disposition: 03:36

## 2020-01-17 NOTE — EMERGENCY DEPARTMENT REPORT
ED General Adult HPI





- General


Chief complaint: Abdominal Pain


Stated complaint: CATH CLOGGED/PAIN


Time Seen by Provider: 01/17/20 21:57


Source: patient, RN notes reviewed, old records reviewed


Mode of arrival: Ambulatory


Limitations: No Limitations





- History of Present Illness


Initial comments: 





During the entire history and physical examination, I am chaperone and escorted 

by nurse Alysia Munoz





The patient is a 54-year-old gentleman with a history of alcohol dependence, 

COPD, depression, urinary retention, chronic indwelling Liriano catheter, and 

external hemorrhoids.  The patient presents to the ER with a primary complaint 

of obstructed Liriano catheter tubing, present 1 day.  He also endorses 

suprapubic pain.  He last produced urine this morning.  No complaint of 

headache, neck pain, chest pain, denies constipation and testicular pain, denies

irritative urinary symptoms.





Also endorses straining during defecation, straining when attempting to urinate,

protrusion of external hemorrhoids, and suspicion for clear mucoid discharge 

with brown stool from rectum.  He denies receptive penetrative anal intercourse,

denies insertion of foreign objects.








Urinary symptoms constant.  Clear nasal discharge symptoms intermittent, now 

relieved at this time, worsened with defecation


-: Gradual


Severity scale (0 -10): 8


Consistency: intermittent


Improves with: rest


Worsens with: other





- Related Data


                                  Previous Rx's











 Medication  Instructions  Recorded  Last Taken  Type


 


Tamsulosin HCl [Flomax] 0.4 mg PO DAILY #30 cap.er.24h 04/09/19 Unknown Rx


 


Nitrofurantoin Monohyd/M-Cryst 100 mg PO BID #13 capsule 04/10/19 Unknown Rx





[Macrobid 100 mg Capsule]    


 


Nitrofurantoin Mono/M-Cryst 100 mg PO Q12HR #14 capsule 09/25/19 Unknown Rx





[Macrobid CAP]    


 


Tamsulosin [Flomax] 0.4 mg PO QDAY #30 cap 01/18/20 Unknown Rx


 


cephALEXin [Keflex] 500 mg PO Q6HR #28 capsule 01/18/20 Unknown Rx











                                    Allergies











Allergy/AdvReac Type Severity Reaction Status Date / Time


 


No Known Allergies Allergy   Verified 03/14/19 13:53














ED Review of Systems


ROS: 


Stated complaint: CATH CLOGGED/PAIN


Other details as noted in HPI





Constitutional: denies: fever


Eyes: denies: eye discharge


ENT: denies: congestion


Respiratory: denies: wheezing


Cardiovascular: denies: syncope


Gastrointestinal: as per HPI.  denies: vomiting


Genitourinary: other (please see history of present illness).  denies: dysuria, 

testicular pain, testicular mass


Neurological: weakness


Psychiatric: as per HPI


Hematological/Lymphatic: denies: easy bleeding





ED Past Medical Hx





- Past Medical History


Hx Liver Disease: No


Hx Arthritis: No


Hx COPD: Yes


Additional medical history: urinary retention/ UTI/ etoh abuse/CARLINE





- Surgical History


Hx Appendectomy: Yes


Additional Surgical History: Toothe pulled





- Social History


Smoking Status: Current Every Day Smoker


Substance Use Type: None





- Medications


Home Medications: 


                                Home Medications











 Medication  Instructions  Recorded  Confirmed  Last Taken  Type


 


Tamsulosin HCl [Flomax] 0.4 mg PO DAILY #30 cap.er.24h 04/09/19  Unknown Rx


 


Nitrofurantoin Monohyd/M-Cryst 100 mg PO BID #13 capsule 04/10/19  Unknown Rx





[Macrobid 100 mg Capsule]     


 


Nitrofurantoin Mono/M-Cryst 100 mg PO Q12HR #14 capsule 09/25/19  Unknown Rx





[Macrobid CAP]     


 


Tamsulosin [Flomax] 0.4 mg PO QDAY #30 cap 01/18/20  Unknown Rx


 


cephALEXin [Keflex] 500 mg PO Q6HR #28 capsule 01/18/20  Unknown Rx














ED Physical Exam





- General


Limitations: No Limitations


General appearance: alert, in no apparent distress





- Head


Head exam: Present: atraumatic, normocephalic





- Eye


Eye exam: Present: normal appearance, EOMI





- ENT


ENT exam: Present: normal exam, normal orophraynx, mucous membranes moist, 

normal external ear exam





- Neck


Neck exam: Present: normal inspection, full ROM.  Absent: tenderness, 

meningismus





- Respiratory


Respiratory exam: Present: normal lung sounds bilaterally.  Absent: respiratory 

distress





- Cardiovascular


Cardiovascular Exam: Present: regular rate, normal rhythm, normal heart sounds. 

 Absent: bradycardia, tachycardia, irregular rhythm, systolic murmur, diastolic 

murmur, rubs, gallop





- GI/Abdominal


GI/Abdominal exam: Present: soft.  Absent: distended, tenderness, guarding, 

rebound, rigid, pulsatile mass





- Rectal


Rectal exam: Present: normal inspection, hemorrhoids, other (no active bleeding 

or mucoid discharge is noted.  Chaperoned by nurse Alysia Munoz)





- 


 exam: Present: normal inspection, other (there is normal testicular lie.  

There is normal cremasteric reflex.  There is no testicular tenderness.  There 

is no testicular swelling).  Absent: testicular tenderness


External exam: Present: normal external exam, other (Liriano catheter in place.  

Chaperoned by nurse MICKEY Munoz)





- Extremities Exam


Extremities exam: Present: normal inspection, full ROM, other (2+ pulses noted 

in the bilateral upper and lower extremities.  The pelvis is stable.  There is 

no long bony tenderness.  The muscular compartments are soft.  There is no 

redness, pus, streaking or erythema.).  Absent: pedal edema, calf tenderness





- Back Exam


Back exam: Present: normal inspection.  Absent: tenderness, CVA tenderness (R), 

CVA tenderness (L), paraspinal tenderness, vertebral tenderness





- Neurological Exam


Neurological exam: Present: alert, normal gait, other (there is no facial droop.

  The tongue is midline.  Extraocular movements are intact bilaterally.  

Speaking in full sentences.  Hearing is grossly intact.  5 out of 5 strength 

bilateral upper and lower extremities.  Sensation is intact to light touch 

bilateral upper and lower extremities.).  Absent: motor sensory deficit





- Psychiatric


Psychiatric exam: Present: anxious





- Skin


Skin exam: Present: warm, dry, intact, normal color.  Absent: rash





ED Course


                                   Vital Signs











  01/17/20 01/17/20





  19:30 22:31


 


Temperature 97.6 F 98.2 F


 


Pulse Rate 77 74


 


Respiratory 18 21





Rate  


 


Blood Pressure 164/81 164/94





[Left]  


 


O2 Sat by Pulse 98 100





Oximetry  














- Reevaluation(s)


Reevaluation #1: 





01/17/20 22:31


Differential diagnosis, including not limited to: Liriano catheter malfunction, 

external hemorrhoids, history of mucoid discharge, renal insufficiency, 

electrolyte derangement





Assessment and plan: 54-year-old gentleman with 2 complaints





Complaint #1, Liriano catheter dysfunction and suprapubic discomfort.  Plan is to 

replace Liriano catheter, and obtain a catheterized urine sample for urinalysis 

and culture.





We will check basic metabolic panel to assess for renal insufficiency, and 

electrolyte derangement.  The patient is clinically sober at this time and walks

 with a steady gait.  On numerous prior evaluations he has been referred to [facility] urology, it is not apparent that the patient has followed up.





Complaint #2, hemorrhoids with clear discharge.





Hemorrhoids do not appear to be superinfected.  There is no bleeding noted on my

 rectal examination.  There is no mucoid discharge at this time noted.  This 

does not represent an emergency medical condition.  He can follow-up with her 

outpatient primary care doctor or gastroenterologist.  Sits baths as needed, 

diet and lifestyle modifications.


Reevaluation #2: 





01/18/20 01:30


The patient is reassessed.  He is in no acute distress at this time.  He feels 

much improved.  450 mL of urine were retained after placement of a Liriano 

catheter.  Old culture results are reviewed and appreciated.  Previously has 

demonstrated sensitivity to penicillin, therefore, we will discharge with 

Keflex.





He is counseled to discontinue beer consumption.  He is again counseled to 

follow-up with outpatient urology.  He can also follow up with outpatient 

gastroenterology for his external hemorrhoids.














ED Medical Decision Making





- Lab Data


Result diagrams: 


                                 01/17/20 22:36





                                 01/17/20 22:36








                                   Vital Signs











  01/17/20 01/17/20





  19:30 22:31


 


Temperature 97.6 F 98.2 F


 


Pulse Rate 77 74


 


Respiratory 18 21





Rate  


 


Blood Pressure 164/81 164/94





[Left]  


 


O2 Sat by Pulse 98 100





Oximetry  











                                   Lab Results











  01/17/20 01/17/20 01/17/20 Range/Units





  22:29 22:36 22:36 


 


WBC   9.5   (4.5-11.0)  K/mm3


 


RBC   4.67   (3.65-5.03)  M/mm3


 


Hgb   14.5   (11.8-15.2)  gm/dl


 


Hct   42.0   (35.5-45.6)  %


 


MCV   90   (84-94)  fl


 


MCH   31   (28-32)  pg


 


MCHC   35 H   (32-34)  %


 


RDW   13.6   (13.2-15.2)  %


 


Plt Count   227   (140-440)  K/mm3


 


Sodium    129 L  (137-145)  mmol/L


 


Potassium    4.5  (3.6-5.0)  mmol/L


 


Chloride    89.6 L  ()  mmol/L


 


Carbon Dioxide    22  (22-30)  mmol/L


 


Anion Gap    22  mmol/L


 


BUN    9  (9-20)  mg/dL


 


Creatinine    0.9  (0.8-1.5)  mg/dL


 


Estimated GFR    > 60  ml/min


 


BUN/Creatinine Ratio    10  %


 


Glucose    114 H  ()  mg/dL


 


Calcium    9.7  (8.4-10.2)  mg/dL


 


Magnesium    1.70  (1.7-2.3)  mg/dL


 


Total Bilirubin    0.40  (0.1-1.2)  mg/dL


 


AST    15  (5-40)  units/L


 


ALT    8  (7-56)  units/L


 


Alkaline Phosphatase    81  ()  units/L


 


Total Creatine Kinase    56  ()  units/L


 


Total Protein    7.9  (6.3-8.2)  g/dL


 


Albumin    4.1  (3.9-5)  g/dL


 


Albumin/Globulin Ratio    1.1  %


 


Urine Color  Yellow    (Yellow)  


 


Urine Turbidity  Cloudy    (Clear)  


 


Urine pH  8.0 H    (5.0-7.0)  


 


Ur Specific Gravity  1.011    (1.003-1.030)  


 


Urine Protein  100 mg/dl    (Negative)  mg/dL


 


Urine Glucose (UA)  Neg    (Negative)  mg/dL


 


Urine Ketones  Neg    (Negative)  mg/dL


 


Urine Blood  Lg    (Negative)  


 


Urine Nitrite  Pos    (Negative)  


 


Urine Bilirubin  Neg    (Negative)  


 


Urine Urobilinogen  < 2.0    (<2.0)  mg/dL


 


Ur Leukocyte Esterase  Lg    (Negative)  


 


Urine WBC (Auto)  > 182.0 H    (0.0-6.0)  /HPF


 


Urine RBC (Auto)  > 182.0    (0.0-6.0)  /HPF


 


Urine Bacteria (Auto)  2+    (Negative)  /HPF


 


Urine Mucus  Few    /HPF


 


Urine Yeast (Budding)  3+    /HPF











Critical care attestation.: 


If time is entered above; I have spent that time in minutes in the direct care 

of this critically ill patient, excluding procedure time.








ED Disposition


Clinical Impression: 


 External hemorrhoid





Liriano catheter problem


Qualifiers:


 Encounter type: subsequent encounter Qualified Code(s): T83.9XXD - Unspecified 

complication of genitourinary prosthetic device, implant and graft, subsequent 

encounter





Disposition: DC-01 TO HOME OR SELFCARE


Is pt being admited?: No


Does the pt Need Aspirin: No


Condition: Stable


Instructions:  Sitz Bath (GEN), Hemorrhoids (ED), Liriano Catheter Placement and 

Care (ED)


Additional Instructions: 


Recommend the patient minimize consumption of beer and alcohol.  Recommend 

patient take multivitamin over-the-counter daily.  Cultures were sent today, 

results will be available in the next 3-5 days.  Please have a primary care 

doctor or urology specialist contact the medical records department to obtain 

culture results.  Patient should drink 4-6 cups of water per day, and eat plenty

 of fiber, vegetables and lean protein.  Patient may use sitz baths as often as 

as needed for external hemorrhoids.





Recommend patient follow-up with an outpatient urology specialist for management

 of indwelling Liriano catheter.  Patient should follow-up within the next 7-10 

days.








Recommend patient follow up with an outpatient gastroenterologist for hemorrhage

 within the next 4-6 weeks.





Please return to the emergency room right away with projectile vomiting, change 

in mental status, confusion, inability to tolerate liquid feeds, new, worsened 

or different symptoms not present on the initial emergency room evaluation.





Referrals: 


NENA JAIME MD [Staff Physician] - 3-5 Days


[facility] [Provider Group] - 3-5 Days


[facility] [Provider Group] - 3-5 Days


[facility] [Provider Group] - 3-5 Days

## 2020-12-21 NOTE — EMERGENCY DEPARTMENT REPORT
ED General Adult HPI





- General


Chief complaint: Urogenital-Male


Stated complaint: CAMPA CATHETER CHANGE


Time Seen by Provider: 12/21/20 08:36


Source: patient


Mode of arrival: Ambulatory


Limitations: No Limitations





- History of Present Illness


Initial comments: 


This is a 54-year old man who requests Campa change for clogged catheter.  

Apparently he has been coming to the emergency department for the same for quite

some time.  He denies ever going to a urologist.  He denies fever or chills.  He

denies any other acute problems.  He complains of suprapubic discomfort 

associated with the poorly draining Campa.  He states that urine is partially 

flowing.





As per January 7, 2020 history:





The patient is a 54-year-old gentleman with a history of alcohol dependence, 

COPD, depression, urinary retention, chronic indwelling Cmapa catheter, and 

external hemorrhoids.  The patient presents to the ER with a primary complaint 

of obstructed Campa catheter tubing, present 1 day.  He also endorses 

suprapubic pain.  He last produced urine this morning.  No complaint of 

headache, neck pain, chest pain, denies constipation and testicular pain, denies

irritative urinary symptoms.





-: Gradual, year(s)


Consistency: constant


Associated Symptoms: denies other symptoms





- Related Data


                                  Previous Rx's











 Medication  Instructions  Recorded  Last Taken  Type


 


Tamsulosin HCl [Flomax] 0.4 mg PO DAILY #30 cap.er.24h 04/09/19 Unknown Rx


 


Nitrofurantoin Monohyd/M-Cryst 100 mg PO BID #13 capsule 04/10/19 Unknown Rx





[Macrobid 100 mg Capsule]    


 


Nitrofurantoin Mono/M-Cryst 100 mg PO Q12HR #14 capsule 09/25/19 Unknown Rx





[Macrobid CAP]    


 


Tamsulosin [Flomax] 0.4 mg PO QDAY #30 cap 01/18/20 Unknown Rx


 


cephALEXin [Keflex] 500 mg PO Q6HR #28 capsule 01/18/20 Unknown Rx











                                    Allergies











Allergy/AdvReac Type Severity Reaction Status Date / Time


 


No Known Allergies Allergy   Verified 03/14/19 13:53














ED Review of Systems


ROS: 


Stated complaint: CAMPA CATHETER CHANGE


Other details as noted in HPI





Constitutional: denies: chills, fever


Eyes: denies: eye pain, eye discharge, vision change


ENT: denies: ear pain, throat pain


Respiratory: denies: cough, shortness of breath, wheezing


Cardiovascular: denies: chest pain, palpitations


Endocrine: no symptoms reported


Gastrointestinal: other (States chronic problems with hemorrhoids).  denies: 

abdominal pain, nausea, diarrhea


Genitourinary: as per HPI


Musculoskeletal: denies: back pain, joint swelling, arthralgia


Skin: denies: rash, lesions


Neurological: denies: headache, weakness, paresthesias


Psychiatric: denies: anxiety, depression


Hematological/Lymphatic: denies: easy bleeding, easy bruising





ED Past Medical Hx





- Past Medical History


Previous Medical History?: Yes


Hx Liver Disease: No


Hx Arthritis: No


Hx COPD: Yes


Additional medical history: urinary retention/ UTI/ etoh abuse/CARLINE





- Surgical History


Past Surgical History?: Yes


Hx Appendectomy: Yes


Additional Surgical History: Toothe pulled





- Social History


Smoking Status: Never Smoker





- Medications


Home Medications: 


                                Home Medications











 Medication  Instructions  Recorded  Confirmed  Last Taken  Type


 


Tamsulosin HCl [Flomax] 0.4 mg PO DAILY #30 cap.er.24h 04/09/19  Unknown Rx


 


Nitrofurantoin Monohyd/M-Cryst 100 mg PO BID #13 capsule 04/10/19  Unknown Rx





[Macrobid 100 mg Capsule]     


 


Nitrofurantoin Mono/M-Cryst 100 mg PO Q12HR #14 capsule 09/25/19  Unknown Rx





[Macrobid CAP]     


 


Tamsulosin [Flomax] 0.4 mg PO QDAY #30 cap 01/18/20  Unknown Rx


 


cephALEXin [Keflex] 500 mg PO Q6HR #28 capsule 01/18/20  Unknown Rx














ED Physical Exam





- General


Limitations: No Limitations


General appearance: alert, in no apparent distress





- Head


Head exam: Present: atraumatic, normocephalic





- Eye


Eye exam: Present: normal appearance.  Absent: scleral icterus





- ENT


ENT exam: Present: mucous membranes moist





- Neck


Neck exam: Present: normal inspection





- Respiratory


Respiratory exam: Present: normal lung sounds bilaterally.  Absent: respiratory 

distress





- Cardiovascular


Cardiovascular Exam: Present: regular rate, normal rhythm.  Absent: systolic 

murmur, diastolic murmur, rubs, gallop





- GI/Abdominal


GI/Abdominal exam: Present: soft, normal bowel sounds, other (Bladder does 

appear to be distended).  Absent: tenderness, guarding, rebound, rigid





- Rectal


Rectal exam: Present: deferred





- Extremities Exam


Extremities exam: Present: normal inspection





- Back Exam


Back exam: Present: normal inspection





- Neurological Exam


Neurological exam: Present: alert, oriented X3, CN II-XII intact, normal gait 

(Fully ambulatory about the emergency department).  Absent: motor sensory 

deficit





- Psychiatric


Psychiatric exam: Present: normal affect, normal mood





- Skin


Skin exam: Present: warm, dry, intact, normal color.  Absent: rash





ED Course





- Reevaluation(s)


Reevaluation #1: 


Obviously this patient's lack of follow-up with the urologist is an extremely 

unfortunate issue.  He is referred to [facility] as well as direct

 [facility].  His urine will be cultured.


12/21/20 09:03





Critical care attestation.: 


If time is entered above; I have spent that time in minutes in the direct care 

of this critically ill patient, excluding procedure time.








ED Disposition


Clinical Impression: 


Obstructed Campa catheter


Qualifiers:


 Encounter type: initial encounter Qualified Code(s): T83.091A - Other 

mechanical complication of indwelling urethral catheter, initial encounter





Disposition: DC-01 TO HOME OR SELFCARE


Is pt being admited?: No


Does the pt Need Aspirin: No


Condition: Stable


Instructions:  Indwelling Urinary Catheter Care, Adult, Easy-to-Read, Indwelling

 Urinary Catheter Insertion, Care After


Additional Instructions: 


It makes no sense whatsoever just to be coming to the emergency department for 

this problem.  You need continuity of care and appropriate treatment in the 

outpatient setting.  See referrals.


Referrals: 


PRIMARY CARE,MD [Primary Care Provider] - 3-5 Days


[facility] [Provider Group] - 3-5 Days


[facility] [Provider Group] - 3-5 Days


Time of Disposition: 09:04

## 2021-03-02 NOTE — EMERGENCY DEPARTMENT REPORT
ED Male  HPI





- General


Chief complaint: Urogenital-Male


Stated complaint: CATH BLOCKED


Time Seen by Provider: 03/02/21 18:36


Source: patient


Mode of arrival: Ambulatory


Limitations: No Limitations





- History of Present Illness


MD Complaint: dysuria


-: Gradual, days(s)


Location: penis


Quality: aching, dull


Consistency: constant


Improves with: urination


Worsens with: other (unable to urinate)


urinary retention (monae related).  denies: discharge, blood in urine, 

incontinence, other





- Related Data


                                  Previous Rx's











 Medication  Instructions  Recorded  Last Taken  Type


 


Tamsulosin HCl [Flomax] 0.4 mg PO DAILY #30 cap.er.24h 04/09/19 Unknown Rx


 


Nitrofurantoin Monohyd/M-Cryst 100 mg PO BID #13 capsule 04/10/19 Unknown Rx





[Macrobid 100 mg Capsule]    


 


Nitrofurantoin Mono/M-Cryst 100 mg PO Q12HR #14 capsule 09/25/19 Unknown Rx





[Macrobid CAP]    


 


Tamsulosin [Flomax] 0.4 mg PO QDAY #30 cap 01/18/20 Unknown Rx


 


cephALEXin [Keflex] 500 mg PO Q6HR #28 capsule 01/18/20 Unknown Rx


 


Sulfamethoxazole/Trimethoprim 1 each PO BID #20 tablet 03/02/21 Unknown Rx





[Bactrim DS TAB]    











                                    Allergies











Allergy/AdvReac Type Severity Reaction Status Date / Time


 


No Known Allergies Allergy   Verified 03/14/19 13:53














ED Review of Systems


ROS: 


Stated complaint: CATH BLOCKED


Other details as noted in HPI





Comment: All other systems reviewed and negative





ED Past Medical Hx





- Past Medical History


Previous Medical History?: Yes


Hx Liver Disease: No


Hx Arthritis: No


Hx COPD: Yes


Additional medical history: urinary retention/ UTI/ etoh abuse/CARLINE





- Surgical History


Past Surgical History?: Yes


Hx Appendectomy: Yes


Additional Surgical History: Toothe pulled





- Social History


Smoking Status: Never Smoker





- Medications


Home Medications: 


                                Home Medications











 Medication  Instructions  Recorded  Confirmed  Last Taken  Type


 


Tamsulosin HCl [Flomax] 0.4 mg PO DAILY #30 cap.er.24h 04/09/19  Unknown Rx


 


Nitrofurantoin Monohyd/M-Cryst 100 mg PO BID #13 capsule 04/10/19  Unknown Rx





[Macrobid 100 mg Capsule]     


 


Nitrofurantoin Mono/M-Cryst 100 mg PO Q12HR #14 capsule 09/25/19  Unknown Rx





[Macrobid CAP]     


 


Tamsulosin [Flomax] 0.4 mg PO QDAY #30 cap 01/18/20  Unknown Rx


 


cephALEXin [Keflex] 500 mg PO Q6HR #28 capsule 01/18/20  Unknown Rx


 


Sulfamethoxazole/Trimethoprim 1 each PO BID #20 tablet 03/02/21  Unknown Rx





[Bactrim DS TAB]     














ED Physical Exam





- General


Limitations: No Limitations


General appearance: alert, in no apparent distress





- Head


Head exam: Present: atraumatic, normocephalic





- Eye


Eye exam: Present: normal appearance





- ENT


ENT exam: Present: mucous membranes moist





- Neck


Neck exam: Present: normal inspection





- Respiratory


Respiratory exam: Present: normal lung sounds bilaterally.  Absent: respiratory 

distress





- Cardiovascular


Cardiovascular Exam: Present: regular rate, normal rhythm.  Absent: systolic 

murmur, diastolic murmur, rubs, gallop





- GI/Abdominal


GI/Abdominal exam: Present: soft, normal bowel sounds





- Rectal


Rectal exam: Present: deferred





- Extremities Exam


Extremities exam: Present: normal inspection





- Back Exam


Back exam: Present: normal inspection





- Neurological Exam


Neurological exam: Present: alert, oriented X3





- Psychiatric


Psychiatric exam: Present: normal affect, normal mood





- Skin


Skin exam: Present: warm, dry, intact, normal color.  Absent: rash





ED Medical Decision Making





- Medical Decision Making





55-year-old male presents emerged department with Monae catheter in for implant 

which is due for chronic implant plantation due to a genitourinary issue being 

managed by by urology.  Monae catheter became blocked from unknown etiology we 

were able to remove the previous Monae and inserted a new Monae in and evaluate 

800 cc of urine evacuated from the bladder the Monae was then irrigated with 

normal saline and proved to be a functional according to normal standards.  Mr. Tyler Acosta requested antibiotics which he states were forgotten on last 

visit which he thinks is the culprit to his issue currently reports no fever, 

chills, sweats no flank pain feels better and and is very pleased with the 

result having much relief.  He does understand the need to follow-up with his 

urology specialist and may return him to the emergency department should he feel

his condition is reemerging or worsening.  Nurse De Guzman did place the Monae with 

no complications


Critical care attestation.: 


If time is entered above; I have spent that time in minutes in the direct care 

of this critically ill patient, excluding procedure time.








ED Disposition


Clinical Impression: 


 Monae catheter problem





Disposition: DC-01 TO HOME OR SELFCARE


Is pt being admited?: No


Does the pt Need Aspirin: No


Condition: Stable


Instructions:  Indwelling Urinary Catheter Insertion, Indwelling Urinary 

Catheter Insertion, Care After


Prescriptions: 


Sulfamethoxazole/Trimethoprim [Bactrim DS TAB] 1 each PO BID #20 tablet


Referrals: 


[facility] [Provider Group] - 3-5 Days


[facility] [Provider Group] - 3-5 Days


[facility] [Outside] - 3-5 Days


[facility] [Outside] - 3-5 Days

## 2021-06-28 NOTE — EMERGENCY DEPARTMENT REPORT
HPI





- General


Chief Complaint: Urogenital-Male


Time Seen by Provider: 06/28/21 10:41





- HPI


HPI: 





This is a 56-year-old  male presents to the emergency department with 

complaint of a clogged or malfunctioning Liriano catheter.  The patient says that 

he initially had a Liriano catheter placed "before the pandemic" secondary to 

urinary retention.  He was here 3 months ago, in March, for the same complaint 

of a clogged or malfunctioning Liriano catheter.  The patient says that the Liriano 

catheter has not been draining since yesterday afternoon and he has started to 

develop some lower abdominal and/or suprapubic pain.  He has not seen a 

urologist since the initial Liriano catheter placement secondary to finances and 

insurance issues.  He is a tobacco smoker.  He denies any other past medical 

history.  He does not have a primary care physician.  He has not taken anything 

for his symptoms prior to presentation today.





ED Past Medical Hx





- Past Medical History


Hx Liver Disease: No


Hx Arthritis: No


Hx COPD: Yes


Additional medical history: urinary retention/ UTI/ etoh abuse/CARLINE





- Surgical History


Hx Appendectomy: Yes


Additional Surgical History: Toothe pulled





- Social History


Smoking Status: Current Every Day Smoker


Substance Use Type: None





- Medications


Home Medications: 


                                Home Medications











 Medication  Instructions  Recorded  Confirmed  Last Taken  Type


 


Tamsulosin HCl [Flomax] 0.4 mg PO DAILY #30 cap.er.24h 04/09/19  Unknown Rx


 


Nitrofurantoin Monohyd/M-Cryst 100 mg PO BID #13 capsule 04/10/19  Unknown Rx





[Macrobid 100 mg Capsule]     


 


Nitrofurantoin Mono/M-Cryst 100 mg PO Q12HR #14 capsule 09/25/19  Unknown Rx





[Macrobid CAP]     


 


Tamsulosin [Flomax] 0.4 mg PO QDAY #30 cap 01/18/20  Unknown Rx


 


cephALEXin [Keflex] 500 mg PO Q6HR #28 capsule 01/18/20  Unknown Rx


 


Sulfamethoxazole/Trimethoprim 1 each PO BID #14 tablet 06/28/21  Unknown Rx





[Bactrim DS TAB]     














ED Review of Systems


ROS: 


Stated complaint: CATH CHANGE


Other details as noted in HPI





Comment: All other systems reviewed and negative


Constitutional: denies: chills, fever


Eyes: denies: eye pain, vision change


ENT: denies: ear pain, throat pain


Respiratory: denies: cough, shortness of breath


Cardiovascular: denies: chest pain, palpitations


Gastrointestinal: abdominal pain.  denies: vomiting


Genitourinary: other (Urinary retention).  denies: discharge


Musculoskeletal: denies: back pain, arthralgia


Skin: denies: rash, lesions


Neurological: denies: headache, weakness





Physical Exam





- Physical Exam


Vital Signs: 


                                   Vital Signs











  06/28/21





  09:45


 


Temperature 98.4 F


 


Pulse Rate 85


 


Respiratory 14





Rate 


 


Blood Pressure 162/91





[Left] 


 


O2 Sat by Pulse 96





Oximetry 











Physical Exam: 





GENERAL: The patient is well-developed well-nourished.


HENT: Normocephalic.  Atraumatic.    Patient has moist mucous membranes.


EYES: Extraocular motions are intact. 


NECK: Supple. Trachea is midline.


CHEST/LUNGS: Clear to auscultation.  There is no respiratory distress noted.


HEART/CARDIOVASCULAR: Regular.  There is no tachycardia.  There is no murmur.


ABDOMEN: Abdomen is soft.  Mild lower abdominal/suprapubic tenderness to 

palpation.  Patient has normal bowel sounds. 


SKIN: Skin is warm and dry. 


NEURO: The patient is awake, alert, and oriented.  The patient is cooperative. 

Normal speech.


MUSCULOSKELETAL: There is no tenderness or deformity.  There is no limitation 

range of motion. 





ED Course


                                   Vital Signs











  06/28/21





  09:45


 


Temperature 98.4 F


 


Pulse Rate 85


 


Respiratory 14





Rate 


 


Blood Pressure 162/91





[Left] 


 


O2 Sat by Pulse 96





Oximetry 














ED Medical Decision Making





- Lab Data





                                   Lab Results











  06/28/21 Range/Units





  12:15 


 


Urine Color  Yellow  (Yellow)  


 


Urine Turbidity  Slightly-cloudy  (Clear)  


 


Urine pH  7.0  (5.0-7.0)  


 


Ur Specific Gravity  1.011  (1.003-1.030)  


 


Urine Protein  30 mg/dl  (Negative)  mg/dL


 


Urine Glucose (UA)  Neg  (Negative)  mg/dL


 


Urine Ketones  Neg  (Negative)  mg/dL


 


Urine Blood  Lg  (Negative)  


 


Urine Nitrite  Pos  (Negative)  


 


Urine Bilirubin  Neg  (Negative)  


 


Urine Urobilinogen  < 2.0  (<2.0)  mg/dL


 


Ur Leukocyte Esterase  Lg  (Negative)  


 


Urine WBC (Auto)  35.0 H  (0.0-6.0)  /HPF


 


Urine RBC (Auto)  > 182.0  (0.0-6.0)  /HPF


 


Urine Bacteria (Auto)  4+  (Negative)  /HPF














- Medical Decision Making





This patient presents with a clogged and/or malfunctioning Liriano catheter.  The 

patient has had a Liriano catheter in place for about 1.5 years since he had 

initial acute urinary retention, as the patient has not followed up with 

urology.  The Liriano catheter was replaced here in the patient put out about 500 

cc of cloudy yellow urine.  Urinalysis shows a urinary tract infection and some 

hematuria.  Patient is feeling better after the replacement of the Liriano 

catheter.  He was switched to a leg bag and will be discharged home to follow-up

 outpatient with urology.  Vital signs reassuring including being afebrile.


Critical Care Time: No


Critical care attestation.: 


If time is entered above; I have spent that time in minutes in the direct care 

of this critically ill patient, excluding procedure time.








ED Disposition


Clinical Impression: 


 Urinary retention





Obstructed Liriano catheter


Qualifiers:


 Encounter type: initial encounter Qualified Code(s): T83.091A - Other 

mechanical complication of indwelling urethral catheter, initial encounter





UTI (urinary tract infection)


Qualifiers:


 Urinary tract infection type: acute cystitis Hematuria presence: with hematuria

 Qualified Code(s): N30.01 - Acute cystitis with hematuria





Disposition: DC-01 TO HOME OR SELFCARE


Is pt being admited?: No


Condition: Stable


Instructions:  Urinary Tract Infection, Adult, Easy-to-Read, Indwelling Urinary 

Catheter Insertion, Care After, Acute Urinary Retention, Male


Additional Instructions: 


Please follow-up with a urologist in the next few days.  I have given you a 

referral for a local urologist, Dr. Adams.





Take all medications as prescribed.





Return to the emergency department with any worsening of your symptoms, new or 

concerning symptoms not addressed during this current emergency department 

visit, or with any acute distress.


Prescriptions: 


Sulfamethoxazole/Trimethoprim [Bactrim DS TAB] 1 each PO BID #14 tablet


Referrals: 


[facility] [Primary Care Provider] - 2-3 Days


NENA ADAMS MD [Staff Physician] - 2-3 Days


Time of Disposition: 13:12

## 2021-09-04 NOTE — EMERGENCY DEPARTMENT REPORT
ED Male  HPI





- General


Chief complaint: Urogenital-Male


Stated complaint: CAMPA BLOCKED


Time Seen by Provider: 09/04/21 09:42


Source: patient


Mode of arrival: Ambulatory


Limitations: No Limitations





- History of Present Illness


Initial comments: 





The patient was evaluated in the emergency department for symptoms described in 

the history of present illness.  He/she was evaluated in the context of the 

global COVID-19 pandemic, which necessitated consideration that the patient 

might be at risk for infection with the virus that causes COVID-19.  Institu

tional protocols and algorithms that pertain to the evaluation of patients at 

risk for COVID-19 are in a state of rapid change based on information released 

by regulatory bodies including the CDC and federal and state organizations.  

These policies and algorithms were followed during the patient's care in the 

emergency department.  Please note that these policies, procedures and 

recommendations changed on a rapid basis.








56-year-old  male who frequently visits this emergency room for Campa 

catheter concerns. Patient presents today stating that his catheter is clogged. 

Patient admits to pelvic pressure. Denies any fever chills or hematuria or pus 

or discharge. Patient reports he has a Campa catheter due to prostate issues. 

Patient states he does not have a urologist as he cannot afford 1 and doesn't 

have insurance.








- Related Data


                                  Previous Rx's











 Medication  Instructions  Recorded  Last Taken  Type


 


Tamsulosin HCl [Flomax] 0.4 mg PO DAILY #30 cap.er.24h 04/09/19 Unknown Rx


 


Nitrofurantoin Monohyd/M-Cryst 100 mg PO BID #13 capsule 04/10/19 Unknown Rx





[Macrobid 100 mg Capsule]    


 


Nitrofurantoin Mono/M-Cryst 100 mg PO Q12HR #14 capsule 09/25/19 Unknown Rx





[Macrobid CAP]    


 


Tamsulosin [Flomax] 0.4 mg PO QDAY #30 cap 01/18/20 Unknown Rx


 


cephALEXin [Keflex] 500 mg PO Q6HR #28 capsule 01/18/20 Unknown Rx


 


Sulfamethoxazole/Trimethoprim 1 each PO BID #14 tablet 06/28/21 Unknown Rx





[Bactrim DS TAB]    











                                    Allergies











Allergy/AdvReac Type Severity Reaction Status Date / Time


 


No Known Allergies Allergy   Verified 09/04/21 08:55














ED Review of Systems


ROS: 


Stated complaint: CAMPA BLOCKED


Other details as noted in HPI








ED Past Medical Hx





- Past Medical History


Hx Liver Disease: No


Hx Arthritis: No


Hx COPD: Yes


Additional medical history: urinary retention/ UTI/ etoh abuse/CARLINE





- Surgical History


Hx Appendectomy: Yes


Additional Surgical History: Toothe pulled





- Social History


Smoking Status: Current Every Day Smoker


Substance Use Type: Alcohol





- Medications


Home Medications: 


                                Home Medications











 Medication  Instructions  Recorded  Confirmed  Last Taken  Type


 


Tamsulosin HCl [Flomax] 0.4 mg PO DAILY #30 cap.er.24h 04/09/19  Unknown Rx


 


Nitrofurantoin Monohyd/M-Cryst 100 mg PO BID #13 capsule 04/10/19  Unknown Rx





[Macrobid 100 mg Capsule]     


 


Nitrofurantoin Mono/M-Cryst 100 mg PO Q12HR #14 capsule 09/25/19  Unknown Rx





[Macrobid CAP]     


 


Tamsulosin [Flomax] 0.4 mg PO QDAY #30 cap 01/18/20  Unknown Rx


 


cephALEXin [Keflex] 500 mg PO Q6HR #28 capsule 01/18/20  Unknown Rx


 


Sulfamethoxazole/Trimethoprim 1 each PO BID #14 tablet 06/28/21  Unknown Rx





[Bactrim DS TAB]     














ED Physical Exam





- General


Limitations: No Limitations


General appearance: alert, in no apparent distress





- Head


Head exam: Present: atraumatic, normocephalic





- Eye


Eye exam: Present: normal appearance





- ENT


ENT exam: Present: normal external ear exam





- Neck


Neck exam: Present: normal inspection, full ROM





- Respiratory


Respiratory exam: Absent: accessory muscle use





- Cardiovascular


Cardiovascular Exam: Present: regular rate





- GI/Abdominal


GI/Abdominal exam: Present: soft, distended, tenderness





- Rectal


Rectal exam: Present: deferred





- Extremities Exam


Extremities exam: Present: normal inspection





- Back Exam


Back exam: Present: normal inspection, full ROM





- Neurological Exam


Neurological exam: Present: alert, oriented X3, normal gait





- Psychiatric


Psychiatric exam: Present: normal affect, normal mood





ED Course


                                   Vital Signs











  09/04/21 09/04/21





  08:56 13:15


 


Temperature 97.4 F L 


 


Pulse Rate 77 


 


Respiratory 20 16





Rate  


 


Blood Pressure 172/92 


 


O2 Sat by Pulse 98 100





Oximetry  














ED Medical Decision Making





- Medical Decision Making





56-year-old  male who frequently visits this emergency room for Campa 

catheter concerns. Patient presents today stating that his catheter is clogged. 

Patient admits to pelvic pressure. Denies any fever chills or hematuria or pus 

or discharge. Patient reports he has a Campa catheter due to prostate issues. 

Patient states he does not have a urologist as he cannot afford 1 and doesn't 

have insurance.











Orders have been placed for Campa catheter removal and insertion. Patient is 

referred to the urologist.  Will be placed by nurse.


Critical care attestation.: 


If time is entered above; I have spent that time in minutes in the direct care 

of this critically ill patient, excluding procedure time.








ED Disposition


Clinical Impression: 


 Urinary retention





Disposition: 01 HOME / SELF CARE / HOMELESS


Is pt being admited?: No


Does the pt Need Aspirin: No


Condition: Stable


Instructions:  Acute Urinary Retention, Male, Easy-to-Read


Additional Instructions: 


Please follow-up with a urologist.


Referrals: 


[facility] [Primary Care Provider] - 3-5 Days


[facility] [Provider Group] - 3-5 Days


[facility] [Provider Group] - 3-5 Days

## 2021-09-28 NOTE — EMERGENCY DEPARTMENT REPORT
HPI





- General


Chief Complaint: Urogenital-Male


Time Seen by Provider: 09/28/21 07:54





- HPI


HPI: 





56-year-old  male presents to the emergency department with a complaint

of urinary retention.  He has a indwelling Liriano catheter that was placed/repl

aced about 3 weeks ago when he presented with the same complaints.  He says that

starting last night the urine greatly decreased into the leg bag, but by this 

morning he says that it is not draining at all.  He has some lower abdominal 

and/or suprapubic discomfort.  He denies any fever, nausea, vomiting, back pain.

 He does not have a urologist secondary to a lack of insurance and financial 

issues.  He has not taken anything for symptoms prior to presentation today.





ED Past Medical Hx





- Past Medical History


Hx Liver Disease: No


Hx Arthritis: No


Hx COPD: Yes


Additional medical history: urinary retention/ UTI/ etoh abuse/CARLINE





- Surgical History


Hx Appendectomy: Yes


Additional Surgical History: Toothe pulled





- Social History


Smoking Status: Current Every Day Smoker


Substance Use Type: Alcohol





- Medications


Home Medications: 


                                Home Medications











 Medication  Instructions  Recorded  Confirmed  Last Taken  Type


 


Tamsulosin HCl [Flomax] 0.4 mg PO DAILY #30 cap.er.24h 04/09/19  Unknown Rx


 


Nitrofurantoin Monohyd/M-Cryst 100 mg PO BID #13 capsule 04/10/19  Unknown Rx





[Macrobid 100 mg Capsule]     


 


Nitrofurantoin Mono/M-Cryst 100 mg PO Q12HR #14 capsule 09/25/19  Unknown Rx





[Macrobid CAP]     


 


Tamsulosin [Flomax] 0.4 mg PO QDAY #30 cap 01/18/20  Unknown Rx


 


cephALEXin [Keflex] 500 mg PO Q6HR #28 capsule 01/18/20  Unknown Rx


 


Sulfamethoxazole/Trimethoprim 1 each PO BID #14 tablet 09/28/21  Unknown Rx





[Bactrim DS TAB]     














ED Review of Systems


ROS: 


Stated complaint: CATHETER CLOGGED


Other details as noted in HPI





Comment: All other systems reviewed and negative


Constitutional: denies: chills, fever


Eyes: denies: eye pain, vision change


ENT: denies: ear pain, throat pain


Respiratory: denies: cough, shortness of breath


Cardiovascular: denies: chest pain, palpitations


Gastrointestinal: abdominal pain.  denies: vomiting


Genitourinary: other (urinary retention).  denies: hematuria, discharge


Musculoskeletal: denies: back pain, arthralgia


Skin: denies: rash, lesions


Neurological: denies: headache, weakness





Physical Exam





- Physical Exam


Vital Signs: 


                                   Vital Signs











  09/28/21





  07:30


 


Temperature 97.5 F L


 


Pulse Rate 84


 


Respiratory 19





Rate 


 


Blood Pressure 190/95


 


O2 Sat by Pulse 97





Oximetry 











Physical Exam: 





GENERAL: The patient is well-developed well-nourished.


HENT: Normocephalic.  Atraumatic.    Patient has moist mucous membranes.


EYES: Extraocular motions are intact.  


NECK: Supple. Trachea is midline.


CHEST/LUNGS: Clear to auscultation.  There is no respiratory distress noted.


HEART/CARDIOVASCULAR: Regular.  There is no tachycardia.  There is no murmur.


ABDOMEN: Abdomen is soft.  Mild lower abdominal/suprapubic tenderness to 

palpation.  No guarding.  Patient has normal bowel sounds.  


SKIN: Skin is warm and dry.  Patient has multiple small nontender, mobile 

scrotal lumps.


NEURO: The patient is awake, alert, and oriented.  The patient is cooperative.  

The patient has no focal neurologic deficits.  Normal speech.


MUSCULOSKELETAL: There is no tenderness or deformity.  There is no limitation 

range of motion.  


BACK: No CVA tenderness to palpation.


: Indwelling Liriano catheter in place.





ED Course


                                   Vital Signs











  09/28/21





  07:30


 


Temperature 97.5 F L


 


Pulse Rate 84


 


Respiratory 19





Rate 


 


Blood Pressure 190/95


 


O2 Sat by Pulse 97





Oximetry 














ED Medical Decision Making





- Lab Data





                                   Lab Results











  09/28/21 Range/Units





  Unknown 


 


Urine Color  Yellow  (Yellow)  


 


Urine Turbidity  Cloudy  (Clear)  


 


Urine pH  7.0  (5.0-7.0)  


 


Ur Specific Gravity  1.009  (1.003-1.030)  


 


Urine Protein  100 mg/dl  (Negative)  mg/dL


 


Urine Glucose (UA)  Neg  (Negative)  mg/dL


 


Urine Ketones  20  (Negative)  mg/dL


 


Urine Blood  Lg  (Negative)  


 


Urine Nitrite  Pos  (Negative)  


 


Urine Bilirubin  Neg  (Negative)  


 


Urine Urobilinogen  < 2.0  (<2.0)  mg/dL


 


Ur Leukocyte Esterase  Lg  (Negative)  


 


Urine WBC (Auto)  39.0 H  (0.0-6.0)  /HPF


 


Urine RBC (Auto)  96.0  (0.0-6.0)  /HPF


 


Urine Bacteria (Auto)  1+  (Negative)  /HPF


 


Urine Mucus  Few  /HPF














- Medical Decision Making





This patient presents with a complaint of urinary retention as the Liriano cathet

er, that is in place, is no longer draining.  He has some mild lower 

abdominal/suprapubic discomfort and tenderness to palpation.  The Liriano catheter

was removed and replaced.  The patient began draining urine and appears to have 

put out about 500 cc of urine thus far.  Urinalysis shows a urinary tract 

infection with about 40 WBCs and hematuria.  The patient is feeling improved 

after Liriano catheter replacement.  Initially the patient presented with very 

elevated blood pressure, but it came down to a more reasonable level after he 

was able to void.  The rest the vital signs are reassuring including being 

afebrile.  He has been placed on antibiotics and given an outpatient referral 

for urology.  The nontender, mobile, scrotal lesion seen are chronic per the 

patient I do not appear consistent with herpes, or any obvious STD.  He will 

return to the emergency department with any worsening of his symptoms or with 

any acute distress.


Critical Care Time: No


Critical care attestation.: 


If time is entered above; I have spent that time in minutes in the direct care 

of this critically ill patient, excluding procedure time.








ED Disposition


Clinical Impression: 


 Urinary retention





UTI (urinary tract infection)


Qualifiers:


 Urinary tract infection type: acute cystitis Hematuria presence: with hematuria

Qualified Code(s): N30.01 - Acute cystitis with hematuria





Obstructed Liriano catheter


Qualifiers:


 Encounter type: initial encounter Qualified Code(s): T83.091A - Other 

mechanical complication of indwelling urethral catheter, initial encounter





Hematuria


Qualifiers:


 Hematuria type: unspecified type Qualified Code(s): R31.9 - Hematuria, 

unspecified





Hypertension


Qualifiers:


 Hypertension type: primary hypertension Qualified Code(s): I10 - Essential 

(primary) hypertension





Disposition: 01 HOME / SELF CARE / HOMELESS


Is pt being admited?: No


Condition: Stable


Instructions:  Indwelling Urinary Catheter Care, Adult, Urinary Tract Infection,

Adult, Hypertension, Adult, Hematuria, Adult, Hypertension (ED)


Additional Instructions: 


Please follow-up with a urologist in the next few days.  I have given you a 

referral for a local urologist, Dr. Adams.





Take all medications as prescribed.





Please try to stay away from foods that are high in salt and caffeinated 

products.  Keep a blood pressure log.





Return to the emergency department with any worsening of your symptoms, new or 

concerning symptoms not addressed during this current emergency department 

visit, or with any acute distress.


Prescriptions: 


Sulfamethoxazole/Trimethoprim [Bactrim DS TAB] 1 each PO BID #14 tablet


Referrals: 


NENA ADAMS MD [Staff Physician] - 2-3 Days


Time of Disposition: 09:07

## 2021-11-19 NOTE — EMERGENCY DEPARTMENT REPORT
ED Male  HPI





- General


Chief complaint: Tube Replacement


Stated complaint: CLOGGED CATH


Time Seen by Provider: 11/19/21 13:20


Source: patient


Mode of arrival: Ambulatory


Limitations: No Limitations





- History of Present Illness


Initial comments: 





Chief complaint: "I am backed up."





HPI: This is a 56-year-old male with history of chronic indwelling Liriano 

catheter, GI bleed, sepsis, COPD who presents with decreased urine output, 

cloudy urine.  Patient also has irritation at the Liriano site.  He denies fever, 

back pain, abdominal pain.  As a teenager he has had multiple  procedures.





This gentleman does not have healthcare insurance.   He is unable to afford 

outpatient urological care.


MD Complaint: other (Decreased urine output, cloudy urine, Liriano catheter 

irritation)


-: Gradual, days(s) (Since last night)


Severity scale (0 -10): 5


Quality: burning


Consistency: constant


Improves with: none


Worsens with: none


denies other symptoms





- Related Data


                                  Previous Rx's











 Medication  Instructions  Recorded  Last Taken  Type


 


Tamsulosin HCl [Flomax] 0.4 mg PO DAILY #30 cap.er.24h 04/09/19 Unknown Rx


 


Nitrofurantoin Monohyd/M-Cryst 100 mg PO BID #13 capsule 04/10/19 Unknown Rx





[Macrobid 100 mg Capsule]    


 


Nitrofurantoin Mono/M-Cryst 100 mg PO Q12HR #14 capsule 09/25/19 Unknown Rx





[Macrobid CAP]    


 


Tamsulosin [Flomax] 0.4 mg PO QDAY #30 cap 01/18/20 Unknown Rx


 


cephALEXin [Keflex] 500 mg PO Q6HR #28 capsule 01/18/20 Unknown Rx


 


Sulfamethoxazole/Trimethoprim 1 each PO BID #14 tablet 09/28/21 Unknown Rx





[Bactrim DS TAB]    


 


cephALEXin [Keflex] 500 mg PO QID 7 Days #28 capsule 11/19/21 Unknown Rx











                                    Allergies











Allergy/AdvReac Type Severity Reaction Status Date / Time


 


No Known Allergies Allergy   Verified 09/04/21 08:55














ED Review of Systems


ROS: 


Stated complaint: CLOGGED CATH


Other details as noted in HPI





Comment: All other systems reviewed and negative


Constitutional: denies: chills


Respiratory: denies: cough


Gastrointestinal: denies: abdominal pain


Musculoskeletal: denies: back pain





ED Past Medical Hx





- Past Medical History


Previous Medical History?: Yes


Hx Liver Disease: No


Hx Arthritis: No


Hx COPD: Yes


Additional medical history: urinary retention/ UTI/ etoh abuse/CARLINE





- Surgical History


Past Surgical History?: Yes


Hx Appendectomy: Yes


Additional Surgical History: Toothe pulled





- Social History


Smoking Status: Current Every Day Smoker


Substance Use Type: Alcohol





- Medications


Home Medications: 


                                Home Medications











 Medication  Instructions  Recorded  Confirmed  Last Taken  Type


 


Tamsulosin HCl [Flomax] 0.4 mg PO DAILY #30 cap.er.24h 04/09/19  Unknown Rx


 


Nitrofurantoin Monohyd/M-Cryst 100 mg PO BID #13 capsule 04/10/19  Unknown Rx





[Macrobid 100 mg Capsule]     


 


Nitrofurantoin Mono/M-Cryst 100 mg PO Q12HR #14 capsule 09/25/19  Unknown Rx





[Macrobid CAP]     


 


Tamsulosin [Flomax] 0.4 mg PO QDAY #30 cap 01/18/20  Unknown Rx


 


cephALEXin [Keflex] 500 mg PO Q6HR #28 capsule 01/18/20  Unknown Rx


 


Sulfamethoxazole/Trimethoprim 1 each PO BID #14 tablet 09/28/21  Unknown Rx





[Bactrim DS TAB]     


 


cephALEXin [Keflex] 500 mg PO QID 7 Days #28 capsule 11/19/21  Unknown Rx














ED Physical Exam





- General


Limitations: No Limitations


General appearance: alert, in no apparent distress





- Head


Head exam: Present: atraumatic, normocephalic





- Eye


Eye exam: Present: normal appearance





- ENT


ENT exam: Present: mucous membranes moist





- Neck


Neck exam: Present: normal inspection, full ROM





- Respiratory


Respiratory exam: Present: normal lung sounds bilaterally.  Absent: respiratory 

distress, wheezes, rales, rhonchi





- Cardiovascular


Cardiovascular Exam: Present: regular rate, normal rhythm, normal heart sounds. 

 Absent: systolic murmur, diastolic murmur, rubs, gallop





- GI/Abdominal


GI/Abdominal exam: Present: soft, normal bowel sounds.  Absent: distended, 

tenderness, guarding, rebound





- Rectal


Rectal exam: Present: deferred





- 


 exam: Present: normal inspection, other (Liriano catheter in place leg bag 

contains cloudy urine).  Absent: testicular tenderness, urethral discharge, 

scrotal swelling, vertical testicular lie, circumcision





- Extremities Exam


Extremities exam: Present: normal inspection





- Back Exam


Back exam: Present: normal inspection





- Neurological Exam


Neurological exam: Present: alert, oriented X3





- Psychiatric


Psychiatric exam: Present: normal affect, normal mood





- Skin


Skin exam: Present: warm, dry, intact, normal color.  Absent: rash





ED Course





- Reevaluation(s)


Reevaluation #1: 





11/19/21 14:10


16 [place] Liriano catheter easily inserted by nurse team member.  Immediately 500 

mL of urine was expressed.











ED Medical Decision Making





- Medical Decision Making





1.  Acute urinary tract section due to obstructed Liriano catheter: Liriano catheter

 replaced by team member.





2.  Complicated UTI prescribe cephalexin refer to outpatient physician referred 

to urologist


Critical care attestation.: 


If time is entered above; I have spent that time in minutes in the direct care 

of this critically ill patient, excluding procedure time.








ED Disposition


Clinical Impression: 


 Obstructed Liriano catheter, Acute urinary retention, Complicated urinary tract 

infection





Disposition: 01 HOME / SELF CARE / HOMELESS


Is pt being admited?: No


Does the pt Need Aspirin: No


Condition: Stable


Instructions:  Acute Urinary Retention, Male, Easy-to-Read, Urinary Tract 

Infection, Adult


Prescriptions: 


cephALEXin [Keflex] 500 mg PO QID 7 Days #28 capsule


Referrals: 


NENA JAIME MD [Staff Physician] - 3-5 Days


MILLA YOUNG MD [Staff Physician] - 3-5 Days

## 2022-07-26 ENCOUNTER — HOSPITAL ENCOUNTER (OUTPATIENT)
Dept: HOSPITAL 5 - XRAY | Age: 57
Discharge: HOME | End: 2022-07-26
Attending: INTERNAL MEDICINE
Payer: COMMERCIAL

## 2022-07-26 DIAGNOSIS — M47.816: Primary | ICD-10-CM

## 2022-07-26 DIAGNOSIS — I70.0: ICD-10-CM

## 2022-07-26 PROCEDURE — 72100 X-RAY EXAM L-S SPINE 2/3 VWS: CPT

## 2022-07-26 NOTE — XRAY REPORT
Lumbar spine 3 views



INDICATION: Back pain



FINDINGS: Mild curvature of the spine. Atherosclerotic calcification throughout the aorta. Facet mancia
ges at L4-5 and L5-S1



Signer Name: Joe Ceron MD 

Signed: 7/26/2022 11:45 AM

Workstation Name: [id number]